# Patient Record
Sex: MALE | Race: WHITE | NOT HISPANIC OR LATINO | Employment: FULL TIME | URBAN - METROPOLITAN AREA
[De-identification: names, ages, dates, MRNs, and addresses within clinical notes are randomized per-mention and may not be internally consistent; named-entity substitution may affect disease eponyms.]

---

## 2018-05-02 ENCOUNTER — OFFICE VISIT (OUTPATIENT)
Dept: INTERNAL MEDICINE | Facility: CLINIC | Age: 28
End: 2018-05-02
Attending: FAMILY MEDICINE
Payer: MEDICAID

## 2018-05-02 VITALS
TEMPERATURE: 99 F | WEIGHT: 180 LBS | OXYGEN SATURATION: 97 % | DIASTOLIC BLOOD PRESSURE: 72 MMHG | HEART RATE: 69 BPM | BODY MASS INDEX: 28.25 KG/M2 | HEIGHT: 67 IN | SYSTOLIC BLOOD PRESSURE: 116 MMHG

## 2018-05-02 DIAGNOSIS — Z00.00 ANNUAL PHYSICAL EXAM: ICD-10-CM

## 2018-05-02 DIAGNOSIS — R42 LIGHTHEADEDNESS: Primary | ICD-10-CM

## 2018-05-02 PROCEDURE — 99395 PREV VISIT EST AGE 18-39: CPT | Mod: S$PBB,,, | Performed by: FAMILY MEDICINE

## 2018-05-02 PROCEDURE — 99999 PR PBB SHADOW E&M-EST. PATIENT-LVL IV: CPT | Mod: PBBFAC,,, | Performed by: FAMILY MEDICINE

## 2018-05-02 PROCEDURE — 99214 OFFICE O/P EST MOD 30 MIN: CPT | Mod: PBBFAC | Performed by: FAMILY MEDICINE

## 2018-05-02 NOTE — PATIENT INSTRUCTIONS
Information about cholesterol, high blood pressure and healthy diet and activity recommendations can be found at the following links on the Internet.    http://www.nhlbi.nih.gov/health/health-topics/topics/hbc    http://www.nhlbi.nih.gov/health/educational/lose_wt/index.htm    Http://www.nhlbi.nih.gov/files/docs/public/heart/hbp_low.pdf    http://www.heart.org/HEARTORG/    http://diabetes.org/    https://www.cdc.gov/    https://healthfinder.gov/        [id number] - [facility]  Clinical  or Psychologist

## 2018-05-02 NOTE — PROGRESS NOTES
Subjective:       Patient ID: Valdemar Allen is a 28 y.o. male.    Chief Complaint: Annual Exam; Headache; Fatigue; and Dizziness    Established patient for an annual wellness check/physical exam and also chronic disease management. Specific complaints - see dictation and please see ROS.        Headache    Associated symptoms include dizziness. Pertinent negatives include no abdominal pain, back pain, coughing, eye redness, fever, neck pain, numbness or weakness.   Fatigue   Associated symptoms include fatigue and headaches. Pertinent negatives include no abdominal pain, arthralgias, chest pain, chills, congestion, coughing, fever, joint swelling, myalgias, neck pain, numbness, rash or weakness.   Dizziness:    Associated symptoms: headaches and palpitations.no fever, no weakness and no chest pain.    Review of Systems   Constitutional: Positive for fatigue. Negative for chills and fever.   HENT: Negative for congestion and trouble swallowing.    Eyes: Negative for redness.   Respiratory: Negative for cough, chest tightness and shortness of breath.    Cardiovascular: Positive for palpitations. Negative for chest pain and leg swelling.   Gastrointestinal: Negative for abdominal pain and blood in stool.   Genitourinary: Negative for hematuria.   Musculoskeletal: Negative for arthralgias, back pain, gait problem, joint swelling, myalgias and neck pain.   Skin: Negative for color change and rash.   Neurological: Positive for dizziness and headaches. Negative for tremors, speech difficulty, weakness and numbness.   Hematological: Negative for adenopathy. Does not bruise/bleed easily.   Psychiatric/Behavioral: Positive for dysphoric mood and sleep disturbance. Negative for behavioral problems and confusion. The patient is nervous/anxious.        Objective:      Physical Exam   Constitutional: He is oriented to person, place, and time. He appears well-developed and well-nourished.   Eyes: No scleral icterus.   Neck: Normal  range of motion. Neck supple. No JVD present. Carotid bruit is not present. No tracheal deviation present. No thyromegaly present.   Cardiovascular: Normal rate, regular rhythm, normal heart sounds and intact distal pulses.  Exam reveals no gallop and no friction rub.    No murmur heard.  Pulmonary/Chest: Effort normal and breath sounds normal. No respiratory distress. He has no wheezes. He has no rales.   Abdominal: Soft. Bowel sounds are normal. He exhibits no distension and no mass. There is no tenderness. There is no rebound and no guarding.   Musculoskeletal: He exhibits no edema.   Lymphadenopathy:     He has no cervical adenopathy.   Neurological: He is alert and oriented to person, place, and time. He displays no tremor. No cranial nerve deficit. Coordination and gait normal.   Skin: Skin is warm and dry. No rash noted. He is not diaphoretic. No erythema.   Psychiatric: He has a normal mood and affect. His behavior is normal. Judgment and thought content normal.   Nursing note and vitals reviewed.      Assessment:       1. Lightheadedness    2. Annual physical exam        Plan:   Valdemar was seen today for annual exam, headache, fatigue and dizziness.    Diagnoses and all orders for this visit:    Lightheadedness  -     CBC Without Differential; Future  -     Basic metabolic panel; Future  -     Lipid panel; Future  -     TSH; Future    Annual physical exam  -     CBC Without Differential; Future  -     Basic metabolic panel; Future  -     Lipid panel; Future  -     TSH; Future      See meds, orders, follow up, routing and instructions sections of encounter.  A 28-year-old established male patient.  He is in for dysphoria,   lightheadedness, appetite changes, weight changes, invasive thoughts.  He has no   suicidal or homicidal ideation.  No romina hallucinations.  He did have a   relationship in November.  He was living in [place] for a while.  He is currently   living here.  He states that his diet has  changed, he has given up   carbohydrates.    His examination is normal.  Symptoms are seemingly without a biologic   explanation.  Suggested continuing exercise, recommended Mediterranean diets,   links were provided.    Follow up in 6 weeks.  Suggested self-referral for a psychologist for discussion   and suggested laboratory.      MAURA/KELLI  dd: 05/02/2018 12:28:07 (CDT)  td: 05/03/2018 09:45:36 (CDT)  Doc ID   #[id number]  Job ID #[id number]    CC:

## 2018-06-14 ENCOUNTER — OFFICE VISIT (OUTPATIENT)
Dept: INTERNAL MEDICINE | Facility: CLINIC | Age: 28
End: 2018-06-14
Attending: FAMILY MEDICINE
Payer: MEDICAID

## 2018-06-14 VITALS
HEIGHT: 67 IN | HEART RATE: 69 BPM | SYSTOLIC BLOOD PRESSURE: 110 MMHG | DIASTOLIC BLOOD PRESSURE: 60 MMHG | WEIGHT: 191 LBS | BODY MASS INDEX: 29.98 KG/M2

## 2018-06-14 DIAGNOSIS — M25.579 ANKLE PAIN, UNSPECIFIED CHRONICITY, UNSPECIFIED LATERALITY: ICD-10-CM

## 2018-06-14 DIAGNOSIS — F43.20 ADJUSTMENT DISORDER, UNSPECIFIED TYPE: ICD-10-CM

## 2018-06-14 DIAGNOSIS — R42 LIGHTHEADEDNESS: Primary | ICD-10-CM

## 2018-06-14 PROCEDURE — 99999 PR PBB SHADOW E&M-EST. PATIENT-LVL III: CPT | Mod: PBBFAC,,, | Performed by: FAMILY MEDICINE

## 2018-06-14 PROCEDURE — 99214 OFFICE O/P EST MOD 30 MIN: CPT | Mod: S$PBB,,, | Performed by: FAMILY MEDICINE

## 2018-06-14 PROCEDURE — 99213 OFFICE O/P EST LOW 20 MIN: CPT | Mod: PBBFAC | Performed by: FAMILY MEDICINE

## 2018-06-14 NOTE — PROGRESS NOTES
Subjective:       Patient ID: Valdemar Allen is a 28 y.o. male.    Chief Complaint: Follow-up    HPI  Review of Systems   Constitutional: Negative for activity change, chills, fatigue, fever and unexpected weight change.   HENT: Negative for congestion, hearing loss, rhinorrhea and trouble swallowing.    Eyes: Negative for discharge, redness and visual disturbance.   Respiratory: Negative for cough, chest tightness, shortness of breath and wheezing.    Cardiovascular: Negative for chest pain, palpitations and leg swelling.   Gastrointestinal: Negative for abdominal pain, blood in stool, constipation, diarrhea and vomiting.   Endocrine: Negative for polydipsia and polyuria.   Genitourinary: Negative for difficulty urinating, hematuria and urgency.   Musculoskeletal: Positive for arthralgias. Negative for back pain, gait problem, joint swelling, myalgias and neck pain.   Skin: Negative for color change and rash.   Neurological: Negative for tremors, speech difficulty, weakness, numbness and headaches.   Hematological: Negative for adenopathy. Does not bruise/bleed easily.   Psychiatric/Behavioral: Positive for dysphoric mood. Negative for behavioral problems, confusion and sleep disturbance. The patient is not nervous/anxious.        Objective:      Physical Exam   Constitutional: He is oriented to person, place, and time. He appears well-developed and well-nourished. No distress.   Neck: Neck supple.   Pulmonary/Chest: Effort normal.   Musculoskeletal: He exhibits no edema.        Right ankle: He exhibits normal range of motion, no deformity and normal pulse. Tenderness. Medial malleolus tenderness found. No lateral malleolus, no AITFL, no CF ligament, no posterior TFL, no head of 5th metatarsal and no proximal fibula tenderness found. Achilles tendon normal. Achilles tendon exhibits no pain, no defect and normal Myles's test results.        Right lower leg: He exhibits no edema.        Left lower leg: He exhibits  no edema.        Feet:    Neurological: He is alert and oriented to person, place, and time.   Skin: Skin is warm and dry. No rash noted.   Psychiatric: He has a normal mood and affect. His behavior is normal. Judgment and thought content normal.   Nursing note and vitals reviewed.      Assessment:       1. Lightheadedness    2. Adjustment disorder, unspecified type    3. Ankle pain, unspecified chronicity, unspecified laterality        Plan:   Valdemar was seen today for follow-up.    Diagnoses and all orders for this visit:    Lightheadedness    Adjustment disorder, unspecified type    Ankle pain, unspecified chronicity, unspecified laterality      See meds, orders, follow up, routing and instructions sections of encounter.  A 28-year-old established male patient.  He is in for a followup.  He states he   is feeling better.  He removed a coffee from his diet.  He has no other acute   complaints at this time.  He states his sleep is better.  Dizziness is gone.    He states he jumped off an object a couple of years ago, experienced pain in the   right lower extremity, improved somewhat, but now when he tries to run he has   some recurrent pain.    On examination, his stated area of tenderness is the approximate posterior   tibialis area.  He does have pes planus.  He is nontender in that area and has   no bony tenderness at the distal tibia.    RECOMMENDATIONS:  Suggested a stretching and inserts and to notify me if this   continues when he runs or if he has sustained pain in the distal tibia area.    He states he feels well enough not to need to pursue Psychology at this time.    He does have the number should things deteriorate.      MAURA/HN  dd: 06/14/2018 13:15:01 (CDT)  td: 06/15/2018 09:31:03 (CDT)  Doc ID   #[id number]  Job ID #[id number]    CC:

## 2019-01-14 ENCOUNTER — NURSE TRIAGE (OUTPATIENT)
Dept: ADMINISTRATIVE | Facility: CLINIC | Age: 29
End: 2019-01-14

## 2019-01-14 NOTE — TELEPHONE ENCOUNTER
[phone] Please call patient if he can be seen in the office.  Patient states that he does notice the chest pain in association to eating but that is radiates down his left arm.    Reason for Disposition   Chest pain lasting longer than 5 minutes    Protocols used:  CHEST PAIN-A-[place]    [id number]

## 2019-01-14 NOTE — TELEPHONE ENCOUNTER
Called to triage to ER and  No answer. LM on patients VM  Please respond directly to patient with any further advice.  Thank you ,[place]

## 2019-01-15 ENCOUNTER — OFFICE VISIT (OUTPATIENT)
Dept: INTERNAL MEDICINE | Facility: CLINIC | Age: 29
End: 2019-01-15
Attending: FAMILY MEDICINE
Payer: MEDICAID

## 2019-01-15 ENCOUNTER — LAB VISIT (OUTPATIENT)
Dept: LAB | Facility: HOSPITAL | Age: 29
End: 2019-01-15
Attending: FAMILY MEDICINE
Payer: MEDICAID

## 2019-01-15 VITALS
HEART RATE: 76 BPM | BODY MASS INDEX: 30.56 KG/M2 | DIASTOLIC BLOOD PRESSURE: 68 MMHG | WEIGHT: 194.69 LBS | OXYGEN SATURATION: 98 % | SYSTOLIC BLOOD PRESSURE: 104 MMHG | HEIGHT: 67 IN | TEMPERATURE: 98 F

## 2019-01-15 DIAGNOSIS — R10.9 ABDOMINAL PAIN, UNSPECIFIED ABDOMINAL LOCATION: ICD-10-CM

## 2019-01-15 DIAGNOSIS — R55 SYNCOPE, UNSPECIFIED SYNCOPE TYPE: ICD-10-CM

## 2019-01-15 DIAGNOSIS — R07.9 CHEST PAIN, UNSPECIFIED TYPE: Primary | ICD-10-CM

## 2019-01-15 DIAGNOSIS — R07.9 CHEST PAIN, UNSPECIFIED TYPE: ICD-10-CM

## 2019-01-15 LAB
ALBUMIN SERPL BCP-MCNC: 4.4 G/DL
ALP SERPL-CCNC: 62 U/L
ALT SERPL W/O P-5'-P-CCNC: 56 U/L
ANION GAP SERPL CALC-SCNC: 7 MMOL/L
AST SERPL-CCNC: 24 U/L
BASOPHILS # BLD AUTO: 0.03 K/UL
BASOPHILS NFR BLD: 0.5 %
BILIRUB SERPL-MCNC: 0.9 MG/DL
BILIRUB UR QL STRIP: NEGATIVE
BUN SERPL-MCNC: 13 MG/DL
CALCIUM SERPL-MCNC: 9.9 MG/DL
CHLORIDE SERPL-SCNC: 101 MMOL/L
CLARITY UR REFRACT.AUTO: CLEAR
CO2 SERPL-SCNC: 30 MMOL/L
COLOR UR AUTO: YELLOW
CREAT SERPL-MCNC: 1.1 MG/DL
DIFFERENTIAL METHOD: ABNORMAL
EOSINOPHIL # BLD AUTO: 0.2 K/UL
EOSINOPHIL NFR BLD: 3 %
ERYTHROCYTE [DISTWIDTH] IN BLOOD BY AUTOMATED COUNT: 13.4 %
EST. GFR  (AFRICAN AMERICAN): >60 ML/MIN/1.73 M^2
EST. GFR  (NON AFRICAN AMERICAN): >60 ML/MIN/1.73 M^2
GLUCOSE SERPL-MCNC: 82 MG/DL
GLUCOSE UR QL STRIP: NEGATIVE
HCT VFR BLD AUTO: 46.5 %
HGB BLD-MCNC: 14.9 G/DL
HGB UR QL STRIP: NEGATIVE
KETONES UR QL STRIP: NEGATIVE
LEUKOCYTE ESTERASE UR QL STRIP: NEGATIVE
LIPASE SERPL-CCNC: 45 U/L
LYMPHOCYTES # BLD AUTO: 2.5 K/UL
LYMPHOCYTES NFR BLD: 39.1 %
MCH RBC QN AUTO: 26.7 PG
MCHC RBC AUTO-ENTMCNC: 32 G/DL
MCV RBC AUTO: 83 FL
MICROSCOPIC COMMENT: NORMAL
MONOCYTES # BLD AUTO: 0.7 K/UL
MONOCYTES NFR BLD: 10.6 %
NEUTROPHILS # BLD AUTO: 3 K/UL
NEUTROPHILS NFR BLD: 46.6 %
NITRITE UR QL STRIP: NEGATIVE
PH UR STRIP: 6 [PH] (ref 5–8)
PLATELET # BLD AUTO: 189 K/UL
PMV BLD AUTO: 10.7 FL
POTASSIUM SERPL-SCNC: 4.1 MMOL/L
PROT SERPL-MCNC: 8 G/DL
PROT UR QL STRIP: NEGATIVE
RBC # BLD AUTO: 5.59 M/UL
SODIUM SERPL-SCNC: 138 MMOL/L
SP GR UR STRIP: 1.02 (ref 1–1.03)
URN SPEC COLLECT METH UR: NORMAL
WBC # BLD AUTO: 6.32 K/UL

## 2019-01-15 PROCEDURE — 85025 COMPLETE CBC W/AUTO DIFF WBC: CPT

## 2019-01-15 PROCEDURE — 99214 OFFICE O/P EST MOD 30 MIN: CPT | Mod: PBBFAC | Performed by: FAMILY MEDICINE

## 2019-01-15 PROCEDURE — 99214 OFFICE O/P EST MOD 30 MIN: CPT | Mod: S$PBB,,, | Performed by: FAMILY MEDICINE

## 2019-01-15 PROCEDURE — 99999 PR PBB SHADOW E&M-EST. PATIENT-LVL IV: CPT | Mod: PBBFAC,,, | Performed by: FAMILY MEDICINE

## 2019-01-15 PROCEDURE — 81001 URINALYSIS AUTO W/SCOPE: CPT

## 2019-01-15 PROCEDURE — 80053 COMPREHEN METABOLIC PANEL: CPT

## 2019-01-15 PROCEDURE — 83690 ASSAY OF LIPASE: CPT

## 2019-01-15 PROCEDURE — 99214 PR OFFICE/OUTPT VISIT, EST, LEVL IV, 30-39 MIN: ICD-10-PCS | Mod: S$PBB,,, | Performed by: FAMILY MEDICINE

## 2019-01-15 PROCEDURE — 99999 PR PBB SHADOW E&M-EST. PATIENT-LVL IV: ICD-10-PCS | Mod: PBBFAC,,, | Performed by: FAMILY MEDICINE

## 2019-01-15 PROCEDURE — 87086 URINE CULTURE/COLONY COUNT: CPT

## 2019-01-15 PROCEDURE — 36415 COLL VENOUS BLD VENIPUNCTURE: CPT

## 2019-01-15 NOTE — PROGRESS NOTES
Subjective:       Patient ID: Valdemar Allen is a 29 y.o. male.    Chief Complaint: Chest Pain; Neck Pain; and Arm Pain    HPI  Review of Systems   Constitutional: Positive for fatigue. Negative for chills, fever and unexpected weight change.   HENT: Negative for congestion and trouble swallowing.    Eyes: Negative for redness and visual disturbance.   Respiratory: Negative for cough, chest tightness and shortness of breath.    Cardiovascular: Positive for chest pain. Negative for palpitations and leg swelling.   Gastrointestinal: Positive for abdominal distention and abdominal pain. Negative for blood in stool.   Genitourinary: Negative for difficulty urinating and hematuria.   Musculoskeletal: Positive for back pain and neck pain. Negative for arthralgias, gait problem, joint swelling and myalgias.   Skin: Negative for color change and rash.   Neurological: Positive for syncope and light-headedness. Negative for tremors, speech difficulty, weakness, numbness and headaches.   Hematological: Negative for adenopathy. Does not bruise/bleed easily.   Psychiatric/Behavioral: Negative for behavioral problems, confusion and sleep disturbance. The patient is not nervous/anxious.        Objective:      Physical Exam   Constitutional: He is oriented to person, place, and time. He appears well-developed and well-nourished.   HENT:   Head: Normocephalic.   Right Ear: Tympanic membrane, external ear and ear canal normal.   Left Ear: Tympanic membrane, external ear and ear canal normal.   Mouth/Throat: Oropharynx is clear and moist.   Eyes: Pupils are equal, round, and reactive to light. No scleral icterus.   Neck: Normal range of motion. Neck supple. No JVD present. Carotid bruit is not present. No tracheal deviation present. No thyromegaly present.   Cardiovascular: Normal rate, regular rhythm, normal heart sounds and intact distal pulses. Exam reveals no gallop and no friction rub.   No murmur heard.  Pulmonary/Chest: Effort  normal and breath sounds normal. No respiratory distress. He has no wheezes. He has no rales.   Abdominal: Soft. He exhibits no distension and no mass. There is no tenderness. There is no rebound and no guarding.   Musculoskeletal: Normal range of motion. He exhibits no edema or tenderness.   Lymphadenopathy:     He has no cervical adenopathy.   Neurological: He is alert and oriented to person, place, and time. He has normal strength. He displays no tremor and normal reflexes. No cranial nerve deficit or sensory deficit. Coordination and gait normal.   Skin: Skin is warm and dry. No rash noted. He is not diaphoretic. No erythema.   Psychiatric: He has a normal mood and affect. His behavior is normal. Judgment and thought content normal.   Nursing note and vitals reviewed.      Assessment:       1. Chest pain, unspecified type    2. Syncope, unspecified syncope type    3. Abdominal pain, unspecified abdominal location        Plan:   Valdemar was seen today for chest pain, neck pain and arm pain.    Diagnoses and all orders for this visit:    Chest pain, unspecified type  -     Lipase; Future  -     CBC auto differential; Future  -     Comprehensive metabolic panel; Future  -     EKG 12-lead; Future  -     Echocardiogram stress test (Cupid Only); Future    Syncope, unspecified syncope type  -     Lipase; Future  -     CBC auto differential; Future  -     Comprehensive metabolic panel; Future  -     EKG 12-lead; Future  -     Echocardiogram stress test (Cupid Only); Future    Abdominal pain, unspecified abdominal location  -     Urinalysis  -     Urinalysis Microscopic  -     Urine culture  -     US Abdomen Complete; Future    Other orders  -     ranitidine (ZANTAC) 300 MG tablet; Take 1 tablet (300 mg total) by mouth every evening.      See meds, orders, follow up, routing and instructions sections of encounter.  This is a patient who had called in yesterday with chest pain.  He was referred   for emergent care; however,  "was scheduled with us today.  He is complaining of   chest pain, left arm and neck pain, abdominal pain.  The pattern seems to be   worse with food, possibly dairy.  His symptoms are difficult to understand as   they clinically overlap.  There is no associated dyspnea.  He does not have any   significant exertion for us to determine whether there is an exertional   component.  He does report an episode of syncope that occurred when getting up   from a couch to urinate approximately one month ago that has not recurred.  He   reports no arrhythmia.  He reports no seizure activity.  He reports no typical   angina.  He does state he has some reflux or indigestion.  He is 29.  He does   not have any significant family history of early or sudden cardiac death.  His   mother did have a cardiac problem at one point.  He is not reporting any typical   dyspnea symptoms.    Physical examination reveals tenderness to the entire abdomen, but no rebound,   guarding and normal bowel sounds.  Cardiopulmonary examination is normal.  His   peripheral vascular examination, skin turgor and capillary refill are normal   (complaint of "poor circulation").    CHART REVIEW:  Ultrasound abdomen on 11/12/2002, hepatic steatosis, no mention   of gallstone.  He had laboratory in the summer.    RECOMMENDATIONS:  His symptoms are difficult to evaluate.  I suggest a workup as   indicated.  Follow up after above workup.  Suggested a trial of Zantac and   consider GI referral for refractory symptoms.  Justification of stress   echocardiogram is a history of atypical chest pain, syncope (although likely   micturitional).      MAURA/HN  dd: 01/15/2019 10:09:46 (CST)  td: 01/16/2019 03:53:48 (CST)  Doc ID   #[id number]  Job ID #[id number]    CC:       "

## 2019-01-16 LAB — BACTERIA UR CULT: NO GROWTH

## 2019-01-17 ENCOUNTER — HOSPITAL ENCOUNTER (OUTPATIENT)
Dept: CARDIOLOGY | Facility: CLINIC | Age: 29
Discharge: HOME | End: 2019-01-17
Attending: FAMILY MEDICINE
Payer: MEDICAID

## 2019-01-17 VITALS — HEIGHT: 67 IN | WEIGHT: 190 LBS | BODY MASS INDEX: 29.82 KG/M2

## 2019-01-17 DIAGNOSIS — R55 SYNCOPE, UNSPECIFIED SYNCOPE TYPE: ICD-10-CM

## 2019-01-17 DIAGNOSIS — R07.9 CHEST PAIN, UNSPECIFIED TYPE: ICD-10-CM

## 2019-01-17 LAB
ASCENDING AORTA: 2.63 CM
BSA FOR ECHO PROCEDURE: 2.02 M2
CV ECHO LV RWT: 0.32 CM
CV STRESS BASE HR: 67
DIASTOLIC BLOOD PRESSURE: 70
DOP CALC LVOT AREA: 3.4 CM2
DOP CALC LVOT DIAMETER: 2.08 CM
DOP CALC LVOT STROKE VOLUME: 62.12 CM3
DOP CALCLVOT PEAK VEL VTI: 18.29 CM
E WAVE DECELERATION TIME: 201.7 MSEC
E/A RATIO: 1.22
E/E' RATIO: 4.87
ECHO LV POSTERIOR WALL: 0.63 CM (ref 0.6–1.1)
FRACTIONAL SHORTENING: 28 % (ref 28–44)
INTERVENTRICULAR SEPTUM: 0.64 CM (ref 0.6–1.1)
IVRT: 0.09 MSEC
LA MAJOR: 5.29 CM
LA MINOR: 5.22 CM
LA WIDTH: 3.4 CM
LEFT ATRIUM SIZE: 3.47 CM
LEFT ATRIUM VOLUME INDEX: 26.6 ML/M2
LEFT ATRIUM VOLUME: 52.7 CM3
LEFT INTERNAL DIMENSION IN SYSTOLE: 2.84 CM (ref 2.1–4)
LEFT VENTRICLE DIASTOLIC VOLUME INDEX: 34.05 ML/M2
LEFT VENTRICLE DIASTOLIC VOLUME: 67.36 ML
LEFT VENTRICLE MASS INDEX: 34.1 G/M2
LEFT VENTRICLE SYSTOLIC VOLUME INDEX: 15.5 ML/M2
LEFT VENTRICLE SYSTOLIC VOLUME: 30.58 ML
LEFT VENTRICULAR INTERNAL DIMENSION IN DIASTOLE: 3.94 CM (ref 3.5–6)
LEFT VENTRICULAR MASS: 67.37 G
LV LATERAL E/E' RATIO: 4
LV SEPTAL E/E' RATIO: 6.22
MV PEAK A VEL: 0.46 M/S
MV PEAK E VEL: 0.56 M/S
OHS CV CPX 1 MINUTE RECOVERY HEART RATE: 139 BPM
OHS CV CPX 85 PERCENT MAX PREDICTED HEART RATE MALE: 162
OHS CV CPX ESTIMATED METS: 13
OHS CV CPX MAX PREDICTED HEART RATE: 191
OHS CV CPX PATIENT IS FEMALE: 0
OHS CV CPX PATIENT IS MALE: 1
OHS CV CPX PEAK DIASTOLIC BLOOD PRESSURE: 89 MMHG
OHS CV CPX PEAK HEAR RATE: 176
OHS CV CPX PEAK RATE PRESSURE PRODUCT: NORMAL
OHS CV CPX PEAK SYSTOLIC BLOOD PRESSURE: 159
OHS CV CPX PERCENT MAX PREDICTED HEART RATE ACHIEVED: 92
OHS CV CPX PERCENT TARGET HEART RATE ACHIEVED: 108.64
OHS CV CPX RATE PRESSURE PRODUCT PRESENTING: 8241
OHS CV CPX TARGET HEART RATE: 162
PULM VEIN S/D RATIO: 1.1
PV PEAK D VEL: 0.4 M/S
PV PEAK S VEL: 0.44 M/S
RA MAJOR: 4.74 CM
RA WIDTH: 3.6 CM
RIGHT VENTRICULAR END-DIASTOLIC DIMENSION: 3.26 CM
RV TISSUE DOPPLER FREE WALL SYSTOLIC VELOCITY 1 (APICAL 4 CHAMBER VIEW): 12.42 M/S
SINUS: 3.24 CM
STJ: 2.68 CM
STRESS ECHO POST EXERCISE DUR MIN: 8 MIN
STRESS ECHO POST EXERCISE DUR SEC: 4
SYSTOLIC BLOOD PRESSURE: 123
TDI LATERAL: 0.14
TDI SEPTAL: 0.09
TDI: 0.12
TRICUSPID ANNULAR PLANE SYSTOLIC EXCURSION: 2.14 CM

## 2019-01-17 PROCEDURE — 93005 ELECTROCARDIOGRAM TRACING: CPT | Mod: PBBFAC | Performed by: INTERNAL MEDICINE

## 2019-01-17 PROCEDURE — 93351 STRESS TTE COMPLETE: CPT | Mod: PBBFAC | Performed by: INTERNAL MEDICINE

## 2019-01-17 PROCEDURE — 93351 ECHOCARDIOGRAM STRESS TEST (CUPID ONLY): ICD-10-PCS | Mod: 26,S$PBB,, | Performed by: INTERNAL MEDICINE

## 2019-01-17 PROCEDURE — 93010 EKG 12-LEAD: ICD-10-PCS | Mod: S$PBB,,, | Performed by: INTERNAL MEDICINE

## 2019-01-17 PROCEDURE — 93010 ELECTROCARDIOGRAM REPORT: CPT | Mod: S$PBB,,, | Performed by: INTERNAL MEDICINE

## 2019-01-18 ENCOUNTER — HOSPITAL ENCOUNTER (OUTPATIENT)
Dept: RADIOLOGY | Facility: HOSPITAL | Age: 29
Discharge: HOME | End: 2019-01-18
Attending: FAMILY MEDICINE
Payer: MEDICAID

## 2019-01-18 DIAGNOSIS — R10.9 ABDOMINAL PAIN, UNSPECIFIED ABDOMINAL LOCATION: ICD-10-CM

## 2019-01-18 PROCEDURE — 76700 US EXAM ABDOM COMPLETE: CPT | Mod: TC

## 2019-01-18 PROCEDURE — 76700 US EXAM ABDOM COMPLETE: CPT | Mod: 26,,, | Performed by: RADIOLOGY

## 2019-01-18 PROCEDURE — 76700 US ABDOMEN COMPLETE: ICD-10-PCS | Mod: 26,,, | Performed by: RADIOLOGY

## 2019-01-28 ENCOUNTER — TELEPHONE (OUTPATIENT)
Dept: INTERNAL MEDICINE | Facility: CLINIC | Age: 29
End: 2019-01-28

## 2019-01-28 NOTE — TELEPHONE ENCOUNTER
----- Message from Esthela Rizvi sent at 1/28/2019  3:30 PM CST -----  Contact: Self  Pt is calling to speak with Staff regarding the medication prescribed for pain.  Pt says it is helping, but is still very gasy.  He is requesting a returned call for guidance?    He can be reached at [phone].    Thank you.

## 2019-01-28 NOTE — TELEPHONE ENCOUNTER
Called patient and discussed labs and or test results. Patient expressed understanding and had the opportunity to ask questions. Any questions were answered. See meds, orders, follow up and instructions sections of encounter.    Specific issues include:  ALT sl elevated  Stress test and US OK  His pain is gone  ?? VINCENT - diet  Still belching, I do not have any further ideas, can get GI if needed

## 2019-05-08 ENCOUNTER — OFFICE VISIT (OUTPATIENT)
Dept: INTERNAL MEDICINE | Facility: CLINIC | Age: 29
End: 2019-05-08
Attending: FAMILY MEDICINE
Payer: MEDICAID

## 2019-05-08 VITALS
DIASTOLIC BLOOD PRESSURE: 68 MMHG | OXYGEN SATURATION: 96 % | BODY MASS INDEX: 30.45 KG/M2 | SYSTOLIC BLOOD PRESSURE: 116 MMHG | WEIGHT: 194 LBS | TEMPERATURE: 98 F | HEART RATE: 64 BPM | HEIGHT: 67 IN

## 2019-05-08 DIAGNOSIS — R14.2 BELCHING: ICD-10-CM

## 2019-05-08 DIAGNOSIS — M54.5 LOW BACK PAIN, UNSPECIFIED BACK PAIN LATERALITY, UNSPECIFIED CHRONICITY, WITH SCIATICA PRESENCE UNSPECIFIED: ICD-10-CM

## 2019-05-08 DIAGNOSIS — R74.01 ELEVATED TRANSAMINASE MEASUREMENT: ICD-10-CM

## 2019-05-08 DIAGNOSIS — Z00.00 ANNUAL PHYSICAL EXAM: Primary | ICD-10-CM

## 2019-05-08 PROCEDURE — 99395 PR PREVENTIVE VISIT,EST,18-39: ICD-10-PCS | Mod: S$PBB,,, | Performed by: FAMILY MEDICINE

## 2019-05-08 PROCEDURE — 99999 PR PBB SHADOW E&M-EST. PATIENT-LVL IV: CPT | Mod: PBBFAC,,, | Performed by: FAMILY MEDICINE

## 2019-05-08 PROCEDURE — 99214 OFFICE O/P EST MOD 30 MIN: CPT | Mod: PBBFAC | Performed by: FAMILY MEDICINE

## 2019-05-08 PROCEDURE — 99395 PREV VISIT EST AGE 18-39: CPT | Mod: S$PBB,,, | Performed by: FAMILY MEDICINE

## 2019-05-08 PROCEDURE — 99999 PR PBB SHADOW E&M-EST. PATIENT-LVL IV: ICD-10-PCS | Mod: PBBFAC,,, | Performed by: FAMILY MEDICINE

## 2019-05-08 NOTE — PROGRESS NOTES
Subjective:       Patient ID: Valdemar Allen is a 29 y.o. male.    Chief Complaint: Annual Exam    Established patient for an annual wellness check/physical exam and also chronic disease management. Specific complaints - see dictation and please see ROS.  P, S, Fm, Soc Hx's; Meds, allergies reviewed and reconciled.  Health maintenance file reviewed and addressed items due.    Review of Systems   Constitutional: Negative for chills, fatigue, fever and unexpected weight change.   HENT: Negative for congestion and trouble swallowing.    Eyes: Negative for redness and visual disturbance.   Respiratory: Negative for cough, chest tightness and shortness of breath.    Cardiovascular: Negative for chest pain, palpitations and leg swelling.   Gastrointestinal: Positive for abdominal distention. Negative for abdominal pain and blood in stool.   Genitourinary: Negative for difficulty urinating and hematuria.   Musculoskeletal: Negative for arthralgias, back pain, gait problem, joint swelling, myalgias and neck pain.   Skin: Negative for color change and rash.   Neurological: Negative for tremors, speech difficulty, weakness, numbness and headaches.   Hematological: Negative for adenopathy. Does not bruise/bleed easily.   Psychiatric/Behavioral: Negative for behavioral problems, confusion and sleep disturbance. The patient is not nervous/anxious.        Objective:      Physical Exam   Constitutional: He appears well-developed and well-nourished.   HENT:   Head: Normocephalic.   Right Ear: Tympanic membrane, external ear and ear canal normal.   Left Ear: Tympanic membrane, external ear and ear canal normal.   Mouth/Throat: Oropharynx is clear and moist.   Eyes: Pupils are equal, round, and reactive to light.   Neck: Normal range of motion. Neck supple. Carotid bruit is not present. No thyromegaly present.   Cardiovascular: Normal rate, regular rhythm, normal heart sounds and intact distal pulses. Exam reveals no gallop and no  friction rub.   No murmur heard.  Pulmonary/Chest: Effort normal and breath sounds normal.   Abdominal: Soft. He exhibits no distension and no mass. There is no tenderness. There is no rebound and no guarding.   Musculoskeletal: Normal range of motion. He exhibits no edema or tenderness.   Lymphadenopathy:     He has no cervical adenopathy.   Neurological: He is alert. He has normal strength. He displays normal reflexes. No cranial nerve deficit or sensory deficit. Coordination and gait normal.   Skin: Skin is warm and dry.   Psychiatric: He has a normal mood and affect. His behavior is normal. Judgment and thought content normal.   Nursing note and vitals reviewed.      Assessment:       1. Annual physical exam    2. Elevated transaminase measurement    3. Belching    4. Low back pain, unspecified back pain laterality, unspecified chronicity, with sciatica presence unspecified        Plan:   Valdemar was seen today for annual exam.    Diagnoses and all orders for this visit:    Annual physical exam    Elevated transaminase measurement  -     Ambulatory referral to Gastroenterology    Belching  -     Ambulatory referral to Gastroenterology  -     H. pylori antigen, stool; Future    Low back pain, unspecified back pain laterality, unspecified chronicity, with sciatica presence unspecified      See meds, orders, follow up, routing and instructions sections of encounter.  A 29-year-old annual physical examination by schedule, states he is still having   persistent bloating and gassiness.  He reports no definite abdominal pain.    States that this is generally all day long.    He has no other acute complaints at this time.  He has intermittent back pain   from time to time.  No new neurologic difficulties.  He has been evaluated by   Dr. Adan in the past.  He has had physical therapy in the past.  He states he is   not interested in pursuing repeat physical therapy or Back Clinic at this time   secondary to not feeling that  elisa.    RECOMMENDATIONS:  GI consult, check H. pylori stool antigen.      MAURA/KELLI  dd: 05/08/2019 14:31:55 (CDT)  td: 05/09/2019 05:22:51 (CDT)  Doc ID   #[id number]  Job ID #[id number]    CC:

## 2019-05-10 ENCOUNTER — LAB VISIT (OUTPATIENT)
Dept: LAB | Facility: HOSPITAL | Age: 29
End: 2019-05-10
Attending: FAMILY MEDICINE
Payer: MEDICAID

## 2019-05-10 DIAGNOSIS — R14.2 BELCHING: ICD-10-CM

## 2019-05-10 PROCEDURE — 87338 HPYLORI STOOL AG IA: CPT

## 2019-05-15 ENCOUNTER — TELEPHONE (OUTPATIENT)
Dept: GASTROENTEROLOGY | Facility: CLINIC | Age: 29
End: 2019-05-15

## 2019-05-15 NOTE — TELEPHONE ENCOUNTER
Ma spoke with pt , pt requested an appt at the [facility] location , message was sent to main campus triage box, pt has medicaid and pt was given the number to [facility] to schedule appt.pt as advised that we can not override [facility] schedule   Pt verbalized understanding and states he will contact [facility] for an appt

## 2019-05-15 NOTE — TELEPHONE ENCOUNTER
----- Message from Cinda Mcmanus sent at 5/15/2019  3:46 PM CDT -----  Contact: self   Needs Advice    Reason for call: pt referred by dr shin for Elevated transaminase measurement [R74.0]  Belching [R14.2] - pt also states he has acid reflux and gas - nothing available for me to book        Communication Preference:[phone]    Additional Information:

## 2019-05-16 ENCOUNTER — TELEPHONE (OUTPATIENT)
Dept: GASTROENTEROLOGY | Facility: CLINIC | Age: 29
End: 2019-05-16

## 2019-05-16 LAB — H PYLORI AG STL QL IA: NOT DETECTED

## 2019-05-16 NOTE — TELEPHONE ENCOUNTER
----- Message from Sara Johnston MA sent at 5/16/2019  2:55 PM CDT -----  Contact: self   Hi, this message was sent to Dr. Mullen. I didn't contact the patient, but I looked in his chart and see that you spoke with him yesterday.     I think this message was supposed to be sent to you.  ----- Message -----  From: Cinda Mcmanus  Sent: 5/16/2019   9:51 AM  To: Sebas Mullen    Needs Advice    Reason for call: returning your call        Communication Preference:[phone]    Additional Information:

## 2019-05-16 NOTE — TELEPHONE ENCOUNTER
Ma returned pt call because message was sent to Dr. Mullen staff and message was forward to Dr lucy KNIGHT , pt states he did not call today for an appt , he was very busy, he states he will call the [facility] for an appt when he gets time ,  The contact number listed/that was sent  was not the correct number for patient

## 2020-01-28 ENCOUNTER — HOSPITAL ENCOUNTER (EMERGENCY)
Facility: HOSPITAL | Age: 30
Discharge: HOME | End: 2020-01-28
Attending: EMERGENCY MEDICINE
Payer: MEDICAID

## 2020-01-28 VITALS
WEIGHT: 190 LBS | TEMPERATURE: 98 F | SYSTOLIC BLOOD PRESSURE: 117 MMHG | BODY MASS INDEX: 29.76 KG/M2 | RESPIRATION RATE: 16 BRPM | HEART RATE: 76 BPM | OXYGEN SATURATION: 97 % | DIASTOLIC BLOOD PRESSURE: 53 MMHG

## 2020-01-28 DIAGNOSIS — R55 NEAR SYNCOPE: ICD-10-CM

## 2020-01-28 DIAGNOSIS — R55 VASOVAGAL SYNCOPE: Primary | ICD-10-CM

## 2020-01-28 DIAGNOSIS — R51.9 HEADACHE: ICD-10-CM

## 2020-01-28 LAB
ALBUMIN SERPL BCP-MCNC: 4.2 G/DL (ref 3.5–5.2)
ALP SERPL-CCNC: 53 U/L (ref 55–135)
ALT SERPL W/O P-5'-P-CCNC: 31 U/L (ref 10–44)
ANION GAP SERPL CALC-SCNC: 8 MMOL/L (ref 8–16)
AST SERPL-CCNC: 14 U/L (ref 10–40)
BASOPHILS # BLD AUTO: 0.02 K/UL (ref 0–0.2)
BASOPHILS NFR BLD: 0.4 % (ref 0–1.9)
BILIRUB SERPL-MCNC: 0.5 MG/DL (ref 0.1–1)
BUN SERPL-MCNC: 11 MG/DL (ref 6–20)
CALCIUM SERPL-MCNC: 9.7 MG/DL (ref 8.7–10.5)
CHLORIDE SERPL-SCNC: 103 MMOL/L (ref 95–110)
CO2 SERPL-SCNC: 27 MMOL/L (ref 23–29)
CREAT SERPL-MCNC: 1.1 MG/DL (ref 0.5–1.4)
DIFFERENTIAL METHOD: NORMAL
EOSINOPHIL # BLD AUTO: 0.1 K/UL (ref 0–0.5)
EOSINOPHIL NFR BLD: 2.1 % (ref 0–8)
ERYTHROCYTE [DISTWIDTH] IN BLOOD BY AUTOMATED COUNT: 12.8 % (ref 11.5–14.5)
EST. GFR  (AFRICAN AMERICAN): >60 ML/MIN/1.73 M^2
EST. GFR  (NON AFRICAN AMERICAN): >60 ML/MIN/1.73 M^2
GLUCOSE SERPL-MCNC: 117 MG/DL (ref 70–110)
HCT VFR BLD AUTO: 45.2 % (ref 40–54)
HGB BLD-MCNC: 14.8 G/DL (ref 14–18)
IMM GRANULOCYTES # BLD AUTO: 0.01 K/UL (ref 0–0.04)
LYMPHOCYTES # BLD AUTO: 1.9 K/UL (ref 1–4.8)
LYMPHOCYTES NFR BLD: 32.9 % (ref 18–48)
MCH RBC QN AUTO: 27.2 PG (ref 27–31)
MCHC RBC AUTO-ENTMCNC: 32.7 G/DL (ref 32–36)
MCV RBC AUTO: 83 FL (ref 82–98)
MONOCYTES # BLD AUTO: 0.5 K/UL (ref 0.3–1)
MONOCYTES NFR BLD: 9.6 % (ref 4–15)
NEUTROPHILS # BLD AUTO: 3.1 K/UL (ref 1.8–7.7)
NEUTROPHILS NFR BLD: 54.8 % (ref 38–73)
NRBC BLD-RTO: 0 /100 WBC
PLATELET # BLD AUTO: 185 K/UL (ref 150–350)
PMV BLD AUTO: 10.2 FL (ref 9.2–12.9)
POTASSIUM SERPL-SCNC: 4 MMOL/L (ref 3.5–5.1)
PROT SERPL-MCNC: 7.7 G/DL (ref 6–8.4)
RBC # BLD AUTO: 5.45 M/UL (ref 4.6–6.2)
SODIUM SERPL-SCNC: 138 MMOL/L (ref 136–145)
WBC # BLD AUTO: 5.65 K/UL (ref 3.9–12.7)

## 2020-01-28 PROCEDURE — 36415 COLL VENOUS BLD VENIPUNCTURE: CPT

## 2020-01-28 PROCEDURE — 93005 ELECTROCARDIOGRAM TRACING: CPT

## 2020-01-28 PROCEDURE — 80053 COMPREHEN METABOLIC PANEL: CPT

## 2020-01-28 PROCEDURE — 85025 COMPLETE CBC W/AUTO DIFF WBC: CPT

## 2020-01-28 PROCEDURE — 99284 EMERGENCY DEPT VISIT MOD MDM: CPT | Mod: 25

## 2020-01-28 NOTE — ED PROVIDER NOTES
Encounter Date: 1/28/2020    SCRIBE #1 NOTE: I, Herb Moreland am scribing for, and in the presence of, Angel Nelson III, MD.       History     Chief Complaint   Patient presents with    near syncope     near syncope this morning, left arm tingling intermittent for a couple days       Time seen by provider: 2:00 PM on 01/28/2020    Valdemar Allen is a 30 y.o. male who presents to the ED with an onset of gradually improving light-headedness lasting approximately 6.5 hours. Pt also reports tingling without numness to the left arm which radiates to the left side of the chest for two days. His initial light-headedness was accompanied by a generalized headache which has since resolved. He endorses experiencing elevated blood pressure and heart rate recordings while at his ophthalmologist's office. The pt states that these symptoms are not familiar to a similar prior ED visit two years ago. He denies any chest pain, visual changes, weakness, or current headaches. PMHx of herniated disc without spinal surgery. No cardiopulmonary PMHx or PSHx.    The history is provided by the patient.     Review of patient's allergies indicates:  No Known Allergies  Past Medical History:   Diagnosis Date    Herniated disc     Prostatitis      Past Surgical History:   Procedure Laterality Date    TONSILLECTOMY       Family History   Problem Relation Age of Onset    Glaucoma Mother     Amblyopia Neg Hx     Blindness Neg Hx     Cataracts Neg Hx     Macular degeneration Neg Hx     Retinal detachment Neg Hx     Strabismus Neg Hx      Social History     Tobacco Use    Smoking status: Never Smoker   Substance Use Topics    Alcohol use: Yes    Drug use: No     Review of Systems   Constitutional: Negative for activity change, appetite change, chills, fatigue and fever.   Eyes: Negative for visual disturbance.   Respiratory: Negative for apnea and shortness of breath.    Cardiovascular: Negative for chest pain and palpitations.    Gastrointestinal: Negative for abdominal distention and abdominal pain.   Genitourinary: Negative for difficulty urinating.   Musculoskeletal: Negative for neck pain.   Skin: Negative for pallor and rash.   Neurological: Positive for light-headedness and headaches. Negative for weakness and numbness.        Positive tingling   Hematological: Does not bruise/bleed easily.   Psychiatric/Behavioral: Negative for agitation.       Physical Exam     Initial Vitals [01/28/20 1342]   BP Pulse Resp Temp SpO2   134/82 100 16 98.3 °F (36.8 °C) 99 %      MAP       --         Physical Exam    Nursing note and vitals reviewed.  Constitutional: He appears well-developed and well-nourished.   HENT:   Head: Normocephalic and atraumatic.   Eyes: Conjunctivae are normal.   Neck: Normal range of motion. Neck supple.   Cardiovascular: Normal rate, regular rhythm and normal heart sounds. Exam reveals no gallop and no friction rub.    No murmur heard.  Pulmonary/Chest: Breath sounds normal. No respiratory distress. He has no wheezes. He has no rhonchi. He has no rales.   Abdominal: Soft. He exhibits no distension. There is no tenderness.   Musculoskeletal: Normal range of motion.   Neurological: He is alert and oriented to person, place, and time.   Decreased light touch sensation to the V5 distribution.   Skin: Skin is warm and dry.   Psychiatric: He has a normal mood and affect.         ED Course   Procedures  Labs Reviewed   COMPREHENSIVE METABOLIC PANEL - Abnormal; Notable for the following components:       Result Value    Glucose 117 (*)     Alkaline Phosphatase 53 (*)     All other components within normal limits   CBC W/ AUTO DIFFERENTIAL     EKG Readings: (Independently Interpreted)   Normal sinus rhythm at a ventricular rate of 74 bpm with normal axis and normal intervals. No STEMI.       Imaging Results          CT Head Without Contrast (Final result)  Result time 01/28/20 14:37:40    Final result by Roxanna Sarabia MD  (01/28/20 14:37:40)                 Impression:      No acute intracranial findings.      Electronically signed by: Roxanna Sarabia MD  Date:    01/28/2020  Time:    14:37             Narrative:    EXAMINATION:  CT HEAD WITHOUT CONTRAST    CLINICAL HISTORY:  Cranial nerve 5 palsy; Headache    TECHNIQUE:  Low dose axial images were obtained through the head.  Coronal and sagittal reformations were also performed. Contrast was not administered.    COMPARISON:  None.    FINDINGS:  Ventricles, sulci, white matter are unremarkable in appearance for the patient's age.  There is no acute intracranial hemorrhage. There is no intracranial mass effect. There is no acute major vascular territory infarct. Note is made that MRI is typically more sensitive than CT particularly for detection of early or small nonhemorrhagic infarcts.  The calvarium appears intact, mastoids well pneumatized, visualized paranasal sinuses essentially clear.                                 Medical Decision Making:   History:   Old Medical Records: I decided to obtain old medical records.  Independently Interpreted Test(s):   I have ordered and independently interpreted EKG Reading(s) - see prior notes  Clinical Tests:   Lab Tests: Ordered and Reviewed  Radiological Study: Reviewed and Ordered  Medical Tests: Ordered and Reviewed  ED Management:  30-year-old male with a history of dizziness and near syncope presents after near syncope.  He appearance a headache; therefore, CT of the head is obtained and is normal. Neurological etiology is unlikely.  EKG fails to demonstrate any evidence of ischemia/MI.  He has had no arrhythmia throughout his ED stay.  The symptoms are most consistent with vasovagal syncope.       APC / Resident Notes:   I, Dr. Angel Nelson III, personally performed the services described in this documentation. All medical record entries made by the scribe were at my direction and in my presence.  I have reviewed the chart and agree  that the record reflects my personal performance and is accurate and complete       Scribe Attestation:   Scribe #1: I performed the above scribed service and the documentation accurately describes the services I performed. I attest to the accuracy of the note.                          Clinical Impression:       ICD-10-CM ICD-9-CM   1. Vasovagal syncope R55 780.2   2. Headache R51 784.0   3. Near syncope R55 780.2         Disposition:   Disposition: Discharged  Condition: Stable                     Angel Nelson III, MD  01/28/20 3099

## 2020-01-28 NOTE — ED NOTES
"Patient is resting in bed with family at the beside without any needs or questions at this time. Patient has been updated on results. Patient states that he feels better than when he arrived, "I just feel fatigued."   "

## 2020-01-28 NOTE — ED NOTES
Upon discharge, patient is AAOx4, no cardiac or respiratory complications. Follow up care and  Medications have been reviewed with patient and has been instructed to return to the ER if needed. Patient verbalized understanding and ambulated to the lobby without difficulty. Shane ALMAGUER

## 2020-01-28 NOTE — ED NOTES
Valdemar Allen presents to the ED with c/o lightheadedness that started a few days ago. Associated complaints are tingling to left arm that radiates to left chest. Patient denies any chest pain. AAOx3. Mucous membranes are pink and moist. Skin is warm, dry and intact. Lungs are clear bilaterally, respirations are regular and unlabored. Denies SOB, cough, congestion or rhinorrhea. BS active x4, no tenderness with palpation, abd is soft and not distended. Denies any appetite or activity change. S1S2, capillary refill is < 2 seconds. Denies dysuria, difficulty urinating, frequency, numbness or weakness. NAND VSS

## 2020-04-07 ENCOUNTER — OFFICE VISIT (OUTPATIENT)
Dept: INTERNAL MEDICINE | Facility: CLINIC | Age: 30
End: 2020-04-07
Attending: FAMILY MEDICINE
Payer: MEDICAID

## 2020-04-07 ENCOUNTER — TELEPHONE (OUTPATIENT)
Dept: INTERNAL MEDICINE | Facility: CLINIC | Age: 30
End: 2020-04-07

## 2020-04-07 ENCOUNTER — LAB VISIT (OUTPATIENT)
Dept: LAB | Facility: HOSPITAL | Age: 30
End: 2020-04-07
Attending: FAMILY MEDICINE
Payer: MEDICAID

## 2020-04-07 ENCOUNTER — PATIENT OUTREACH (OUTPATIENT)
Dept: ADMINISTRATIVE | Facility: OTHER | Age: 30
End: 2020-04-07

## 2020-04-07 DIAGNOSIS — R10.9 FLANK PAIN: ICD-10-CM

## 2020-04-07 DIAGNOSIS — R10.9 ABDOMINAL PAIN, UNSPECIFIED ABDOMINAL LOCATION: Primary | ICD-10-CM

## 2020-04-07 DIAGNOSIS — R10.9 ABDOMINAL PAIN, UNSPECIFIED ABDOMINAL LOCATION: ICD-10-CM

## 2020-04-07 LAB
BILIRUB UR QL STRIP: NEGATIVE
CLARITY UR REFRACT.AUTO: CLEAR
COLOR UR AUTO: ABNORMAL
GLUCOSE UR QL STRIP: NEGATIVE
HGB UR QL STRIP: NEGATIVE
KETONES UR QL STRIP: NEGATIVE
LEUKOCYTE ESTERASE UR QL STRIP: NEGATIVE
MICROSCOPIC COMMENT: NORMAL
NITRITE UR QL STRIP: NEGATIVE
PH UR STRIP: 7 [PH] (ref 5–8)
PROT UR QL STRIP: NEGATIVE
SP GR UR STRIP: 1 (ref 1–1.03)
URN SPEC COLLECT METH UR: ABNORMAL

## 2020-04-07 PROCEDURE — 81001 URINALYSIS AUTO W/SCOPE: CPT

## 2020-04-07 PROCEDURE — 99213 OFFICE O/P EST LOW 20 MIN: CPT | Mod: 95,,, | Performed by: FAMILY MEDICINE

## 2020-04-07 PROCEDURE — 99213 PR OFFICE/OUTPT VISIT, EST, LEVL III, 20-29 MIN: ICD-10-PCS | Mod: 95,,, | Performed by: FAMILY MEDICINE

## 2020-04-07 NOTE — TELEPHONE ENCOUNTER
----- Message from Octavia Quijano sent at 4/7/2020  9:43 AM CDT -----  Contact: Patient [phone]  Patient Is having pain on his right side and is warm to the touch.    Please call and advise.    Thank You

## 2020-04-07 NOTE — PROGRESS NOTES
Subjective:       Patient ID: Valdemar Allen is a 30 y.o. male.    Chief Complaint: No chief complaint on file.    The patient location is:  Home  The chief complaint leading to consultation is:  Right sided abdominal pain  Visit type: Virtual visit with synchronous audio and video  Total time spent with patient:  11 min  Each patient to whom he or she provides medical services by telemedicine is:  (1) informed of the relationship between the physician and patient and the respective role of any other health care provider with respect to management of the patient; and (2) notified that he or she may decline to receive medical services by telemedicine and may withdraw from such care at any time.    Notes:  Patient describes a 1 day onset of right flank/abdominal pain.  On the video he had demonstrated the right lateral abdominal wall, beneath the ribcage, mid axillary line.  Describes it as coming in going.  No fever, nausea, vomiting, diarrhea.  No chest pain or dyspnea.  No hematuria, dysuria.  No history of kidney stones.  He has had 2 abdominal ultrasounds over the past several years, 1 in 2019, neither showed a gallstone.  No prior pain of this nature.  Described as occurring last evening, slept for several hours and it recurred again today.    Review of Systems   Constitutional: Negative for fatigue and fever.   Respiratory: Negative for cough and shortness of breath.    Cardiovascular: Negative for chest pain and palpitations.   Gastrointestinal: Positive for abdominal pain. Negative for abdominal distention, blood in stool, constipation, diarrhea, nausea and vomiting.   Genitourinary: Negative for difficulty urinating and hematuria.       Objective:      Physical Exam    Assessment:       1. Abdominal pain, unspecified abdominal location    2. Flank pain        Plan:     Diagnoses and all orders for this visit:    Abdominal pain, unspecified abdominal location  -     Urinalysis; Future  -     Urinalysis  Microscopic; Future  -     Cancel: Urine culture; Future    Flank pain  -     Urinalysis; Future  -     Urinalysis Microscopic; Future  -     Cancel: Urine culture; Future      See meds, orders, follow up, routing and instructions sections of encounter and AVS. Discussed with patient and provided on AVS.    Pain location difficult to pinpoint diagnosis.  Could consider renal colic but not truly the flank.  Does not appear to be biliary.  Patient denies jaundice.  He was able to push on his abdomen relatively deep without much discomfort at this time.  Check urinalysis today.  Appointment for physical assessment tomorrow.  I consider this urgent care.  Should symptoms worsen today or for new, worsening or concerning symptoms, advised him to go to urgent care today.  Tylenol for pain.

## 2020-04-07 NOTE — Clinical Note
Please call patient to Schedule urinalysis orders for today. Please add patient on in the 1120 same day hold appointment tomorrow

## 2020-04-08 ENCOUNTER — OFFICE VISIT (OUTPATIENT)
Dept: GASTROENTEROLOGY | Facility: CLINIC | Age: 30
End: 2020-04-08
Payer: MEDICAID

## 2020-04-08 ENCOUNTER — OFFICE VISIT (OUTPATIENT)
Dept: INTERNAL MEDICINE | Facility: CLINIC | Age: 30
End: 2020-04-08
Attending: FAMILY MEDICINE
Payer: MEDICAID

## 2020-04-08 VITALS
BODY MASS INDEX: 29.97 KG/M2 | WEIGHT: 190.94 LBS | TEMPERATURE: 97 F | SYSTOLIC BLOOD PRESSURE: 112 MMHG | DIASTOLIC BLOOD PRESSURE: 82 MMHG | OXYGEN SATURATION: 99 % | HEIGHT: 67 IN | HEART RATE: 64 BPM

## 2020-04-08 DIAGNOSIS — R10.33 PERIUMBILICAL ABDOMINAL PAIN: Primary | ICD-10-CM

## 2020-04-08 DIAGNOSIS — K21.9 GASTROESOPHAGEAL REFLUX DISEASE, ESOPHAGITIS PRESENCE NOT SPECIFIED: ICD-10-CM

## 2020-04-08 DIAGNOSIS — R07.89 OTHER CHEST PAIN: Primary | ICD-10-CM

## 2020-04-08 DIAGNOSIS — R10.11 RUQ PAIN: ICD-10-CM

## 2020-04-08 DIAGNOSIS — R14.2 BELCHING: ICD-10-CM

## 2020-04-08 PROCEDURE — 99213 PR OFFICE/OUTPT VISIT, EST, LEVL III, 20-29 MIN: ICD-10-PCS | Mod: S$PBB,,, | Performed by: FAMILY MEDICINE

## 2020-04-08 PROCEDURE — 99213 OFFICE O/P EST LOW 20 MIN: CPT | Mod: S$PBB,,, | Performed by: FAMILY MEDICINE

## 2020-04-08 PROCEDURE — 99204 OFFICE O/P NEW MOD 45 MIN: CPT | Mod: 95,,, | Performed by: INTERNAL MEDICINE

## 2020-04-08 PROCEDURE — 99213 OFFICE O/P EST LOW 20 MIN: CPT | Mod: PBBFAC | Performed by: FAMILY MEDICINE

## 2020-04-08 PROCEDURE — 99999 PR PBB SHADOW E&M-EST. PATIENT-LVL III: ICD-10-PCS | Mod: PBBFAC,,, | Performed by: FAMILY MEDICINE

## 2020-04-08 PROCEDURE — 99204 PR OFFICE/OUTPT VISIT, NEW, LEVL IV, 45-59 MIN: ICD-10-PCS | Mod: 95,,, | Performed by: INTERNAL MEDICINE

## 2020-04-08 PROCEDURE — 99999 PR PBB SHADOW E&M-EST. PATIENT-LVL III: CPT | Mod: PBBFAC,,, | Performed by: FAMILY MEDICINE

## 2020-04-08 RX ORDER — PANTOPRAZOLE SODIUM 40 MG/1
40 TABLET, DELAYED RELEASE ORAL DAILY
Qty: 90 TABLET | Refills: 3 | Status: SHIPPED | OUTPATIENT
Start: 2020-04-08 | End: 2022-01-24

## 2020-04-08 NOTE — LETTER
April 8, 2020      Kyler Caballero MD  [address]  [facility] [address]           [facility]  [address]   [facility] [address]  Phone: [phone]  Fax: [phone]          Patient: Valdemar Allen   MR Number: [id number]   YOB: 1990   Date of Visit: 4/8/2020       Dear Dr. Kyler Cbaallero:    Thank you for referring Valdemar Allen to me for evaluation. Attached you will find relevant portions of my assessment and plan of care.    If you have questions, please do not hesitate to call me. I look forward to following Valdemar Allen along with you.    Sincerely,    Carmela Messina MD    Enclosure  CC:  No Recipients    If you would like to receive this communication electronically, please contact [email or web address] or [phone] to request more information on [employer] access.    For providers and/or their staff who would like to refer a patient to [facility], please contact us through our one-stop-shop provider referral line, [facility], at [phone].    If you feel you have received this communication in error or would no longer like to receive these types of communications, please e-mail [email or web address]

## 2020-04-08 NOTE — PATIENT INSTRUCTIONS
"1.  Start the pantoprazole every morning on an empty stomach.  2.  My office will call you to schedule the EGD once the COVID 19 restrictions have been lifted.  3.  Call [phone] to schedule the HIDA scan, which is to check your gallbladder.  Your level of pain and its progression should dictate whether you do this during the quarantine period.  4.  Try Gas-X but also do some reading about "aerophagia" and follow recommendations to decrease the amount of air that you swallow.  "

## 2020-04-08 NOTE — PROGRESS NOTES
Subjective:       Patient ID: Valdemar Allen is a 30 y.o. male.    Chief Complaint: Abdominal Pain (right side)    Please review patient's digital visit yesterday.  Complaining of right lateral abdominal wall pain.  Between pelvic brim and ribs.  No nausea vomiting diarrhea.  No fever chills back pain.  No dysuria, hematuria.  States he is better today.  Concerned yesterday was hepatic or renal.  Urinalysis reviewed and within normal limits.  No infectious disease complaints such as upper or lower respiratory.    Abdominal Pain   This is a new problem. The current episode started yesterday. The onset quality is gradual. The problem occurs constantly. The most recent episode lasted 3 hours. The problem has been gradually worsening. The pain is located in the right flank. The pain is at a severity of 4/10. The pain is moderate. The quality of the pain is aching and dull. The abdominal pain does not radiate. Associated symptoms include myalgias. Pertinent negatives include no anorexia, arthralgias, belching, constipation, diarrhea, dysuria, fever, flatus, frequency, headaches, hematochezia, hematuria, melena, nausea, vomiting or weight loss. The pain is aggravated by being still. The pain is relieved by activity. He has tried nothing for the symptoms. The treatment provided no relief. There is no history of abdominal surgery, colon cancer, Crohn's disease, gallstones, GERD, irritable bowel syndrome, pancreatitis, PUD or ulcerative colitis. Patient's medical history includes UTI. Patient's medical history does not include kidney stones.     Review of Systems   Constitutional: Negative for chills, diaphoresis, fatigue, fever and weight loss.   HENT: Negative for congestion and sore throat.    Respiratory: Negative for cough, choking, chest tightness, shortness of breath, wheezing and stridor.    Cardiovascular: Negative for chest pain, palpitations and leg swelling.   Gastrointestinal: Positive for abdominal pain.  Negative for anorexia, constipation, diarrhea, flatus, hematochezia, melena, nausea and vomiting.   Genitourinary: Negative for difficulty urinating, dysuria, frequency and hematuria.   Musculoskeletal: Positive for myalgias. Negative for arthralgias.   Neurological: Negative for weakness, light-headedness and headaches.       Objective:      Physical Exam   Constitutional: He is oriented to person, place, and time. He appears well-developed and well-nourished.   HENT:   Head: Normocephalic and atraumatic.   Eyes: Conjunctivae are normal. No scleral icterus.   Neck: Normal range of motion. Neck supple. Carotid bruit is not present.   Cardiovascular: Normal rate, regular rhythm, normal heart sounds and intact distal pulses. Exam reveals no gallop and no friction rub.   No murmur heard.  Pulmonary/Chest: Effort normal and breath sounds normal. No respiratory distress. He has no wheezes. He has no rales.   Abdominal: He exhibits no distension and no mass. There is no tenderness. There is no rebound and no guarding.   Musculoskeletal: He exhibits no edema or deformity.   Neurological: He is alert and oriented to person, place, and time. He displays no tremor. No cranial nerve deficit. Coordination and gait normal.   Skin: Skin is warm and dry. No rash noted. He is not diaphoretic. No erythema.   Psychiatric: He has a normal mood and affect. His behavior is normal. Judgment and thought content normal.   Nursing note and vitals reviewed.      Assessment:       1. Periumbilical abdominal pain        Plan:     Medication List with Changes/Refills   New Medications    PANTOPRAZOLE (PROTONIX) 40 MG TABLET    Take 1 tablet (40 mg total) by mouth once daily.     Valdemar was seen today for abdominal pain.    Diagnoses and all orders for this visit:    Periumbilical abdominal pain      See meds, orders, follow up, routing and instructions sections of encounter and AVS. Discussed with patient and provided on AVS.    His examination  today was within normal limits.  Diagnosis use was periumbilical due to limitations of available diagnoses.  Actual location of pain is lateral.  Not flank.  Not anterior abdomen.  Observe symptoms for improvement/worsening, since he is better today. Also note that he has a virtual visit with GI provider later this am.  Notify us for any change of status.

## 2020-04-08 NOTE — PROGRESS NOTES
"Subjective:       Patient ID: Valdemar Allen is a 30 y.o. male.    Chief Complaint: Gastroesophageal Reflux and GI Problem (chest pain)    The patient location is: [place]  The chief complaint leading to consultation is: chest pain, belching  Visit type: Virtual visit with synchronous audio and video  Total time spent with patient: 15 mins  Each patient to whom he or she provides medical services by telemedicine is:  (1) informed of the relationship between the physician and patient and the respective role of any other health care provider with respect to management of the patient; and (2) notified that he or she may decline to receive medical services by telemedicine and may withdraw from such care at any time.    Notes: 29 yo M complains of chest pain and belching.  He states that when he stopped exercising a year ago he noted onset of these symptoms.  He has severe belching which can occur more than 100 x per day.  He gets heartburn for all foods and liquids, and gets extreme belching with sodas.  He gets a gas pain when he eats fried foods, and that pain seems to cause a "knot" in his lower chest that then causes "tingling" which spreads over his entire left chest and left arm.  He was initially concerned by this chest pain, but now feels strongly that it is gas related and is thus not as concerned.  When things get really bad he takes famotidine, but usually only for a week.  Once he feels better he stops the famotidine, but symptoms always return.  During quarantine he has been exercising by walking more and this seems to have made things somewhat better, but for the past 2 days he has had RUQ pain that is increasing in intensity.      Review of Systems   Constitutional: Negative for chills and fever.   Eyes: Negative for photophobia and visual disturbance.   Respiratory: Negative for chest tightness, shortness of breath and wheezing.    Cardiovascular: Negative for chest pain, palpitations and leg swelling. "   Genitourinary: Negative for dysuria, flank pain and hematuria.   Musculoskeletal: Negative for joint swelling and myalgias.   Skin: Negative for color change and rash.   Neurological: Negative for dizziness and speech difficulty.   Psychiatric/Behavioral: Negative for confusion and hallucinations.       Objective:      Physical Exam   Constitutional: He is oriented to person, place, and time. He appears well-developed and well-nourished.   HENT:   Head: Normocephalic and atraumatic.   Eyes: Pupils are equal, round, and reactive to light. EOM are normal.   Neurological: He is alert and oriented to person, place, and time.   Psychiatric: He has a normal mood and affect. His behavior is normal.       Lab Results   Component Value Date    WBC 5.65 01/28/2020    HGB 14.8 01/28/2020    HCT 45.2 01/28/2020    MCV 83 01/28/2020     01/28/2020     U/s was independently visualized and reviewed by me and showed no acute changes.    Assessment:       1. Other chest pain    2. Gastroesophageal reflux disease, esophagitis presence not specified    3. RUQ pain    4. Belching        Plan:       Other chest pain; Gastroesophageal reflux disease, esophagitis presence not specified; Belching  -     pantoprazole (PROTONIX) 40 MG tablet; Take 1 tablet (40 mg total) by mouth once daily.  Dispense: 90 tablet; Refill: 3  -     Case request GI: EGD (ESOPHAGOGASTRODUODENOSCOPY)  -     Ok to try Gas-X but he needs to read a little about aerophagia and take measures to decrease amount of air ingested.    RUQ pain  -     NM Hepatobiliary Imaging with GB (HIDA); Future; Expected date: 04/08/2020    EGD will be delayed until COVID 19 restrictions are lifted, but the HIDA may need to be done sooner.  I have given the pt the scheduling number and instructed him to schedule now if this pain continues to grow in intensity.  If it begins to resolve, then this test can be delayed.

## 2020-05-04 ENCOUNTER — HOSPITAL ENCOUNTER (OUTPATIENT)
Dept: RADIOLOGY | Facility: HOSPITAL | Age: 30
Discharge: HOME | End: 2020-05-04
Attending: INTERNAL MEDICINE
Payer: MEDICAID

## 2020-05-04 DIAGNOSIS — R10.11 RUQ PAIN: ICD-10-CM

## 2020-05-04 PROCEDURE — 78227 HEPATOBIL SYST IMAGE W/DRUG: CPT | Mod: 26,,, | Performed by: RADIOLOGY

## 2020-05-04 PROCEDURE — 78227 NM HEPATOBILIARY(HIDA) WITH PHARM AND EF: ICD-10-PCS | Mod: 26,,, | Performed by: RADIOLOGY

## 2020-05-04 PROCEDURE — A9537 TC99M MEBROFENIN: HCPCS

## 2020-05-08 ENCOUNTER — TELEPHONE (OUTPATIENT)
Dept: GASTROENTEROLOGY | Facility: CLINIC | Age: 30
End: 2020-05-08

## 2020-05-08 ENCOUNTER — PATIENT MESSAGE (OUTPATIENT)
Dept: GASTROENTEROLOGY | Facility: CLINIC | Age: 30
End: 2020-05-08

## 2020-05-08 DIAGNOSIS — Z01.818 PREOP EXAMINATION: Primary | ICD-10-CM

## 2020-05-08 NOTE — TELEPHONE ENCOUNTER
EGD Prep Instructions    [facility]  [address] in [facility] Office [phone]  Endoscopy Lab [phone]    You are scheduled for an EGD with Dr. Messina on 5/21/20 at [facility].  You will enter through the [facility] Entrance and check in at Same Day Surgery.    Nothing to eat or drink after midnight before the procedure.  You MAY brush your teeth.    You MAY take your blood pressure, heart, and seizure medication on the morning of the procedure, with a SIP of water.  Hold ALL other medications until after the procedure.    If you are on blood thinners THAT YOU HAVE BEEN INSTRUCTED TO HOLD BY YOUR DOCTOR FOR THIS PROCEDURE, then do NOT take this the morning of your EGD.  Do NOT stop these medications on your own, they must be approved to be held by your doctor.  Your EGD can NOT be done if you are on these medications.  Examples of blood thinners include: Coumadin, Aggrenox, Plavix, Pradaxa, Reapro, Pletal, Xarelto, Ticagrelor, Brilinta, Eliquis, and high dose aspirin (325 mg).  You do not have to stop baby aspirin 81 mg.    You will receive a call 2-3 days before your EGD to tell you the time to arrive.  If you have not received a call by the day before your procedure, call the Endoscopy Lab at [phone].

## 2020-05-21 PROBLEM — R07.9 CHEST PAIN: Status: ACTIVE | Noted: 2020-05-21

## 2020-06-02 ENCOUNTER — TELEPHONE (OUTPATIENT)
Dept: GASTROENTEROLOGY | Facility: CLINIC | Age: 30
End: 2020-06-02

## 2021-03-08 ENCOUNTER — TELEPHONE (OUTPATIENT)
Dept: INTERNAL MEDICINE | Facility: CLINIC | Age: 31
End: 2021-03-08

## 2021-03-08 ENCOUNTER — HOSPITAL ENCOUNTER (EMERGENCY)
Facility: HOSPITAL | Age: 31
Discharge: HOME | End: 2021-03-08
Attending: EMERGENCY MEDICINE
Payer: MEDICAID

## 2021-03-08 VITALS
TEMPERATURE: 99 F | BODY MASS INDEX: 30.61 KG/M2 | DIASTOLIC BLOOD PRESSURE: 77 MMHG | HEART RATE: 79 BPM | OXYGEN SATURATION: 98 % | RESPIRATION RATE: 18 BRPM | SYSTOLIC BLOOD PRESSURE: 130 MMHG | WEIGHT: 195 LBS | HEIGHT: 67 IN

## 2021-03-08 DIAGNOSIS — R51.9 SINUS HEADACHE: Primary | ICD-10-CM

## 2021-03-08 PROCEDURE — 99282 EMERGENCY DEPT VISIT SF MDM: CPT

## 2021-03-08 PROCEDURE — 99284 EMERGENCY DEPT VISIT MOD MDM: CPT | Mod: ,,, | Performed by: EMERGENCY MEDICINE

## 2021-03-08 PROCEDURE — 99284 PR EMERGENCY DEPT VISIT,LEVEL IV: ICD-10-PCS | Mod: ,,, | Performed by: EMERGENCY MEDICINE

## 2021-03-11 ENCOUNTER — TELEPHONE (OUTPATIENT)
Dept: INTERNAL MEDICINE | Facility: CLINIC | Age: 31
End: 2021-03-11

## 2021-03-17 ENCOUNTER — OFFICE VISIT (OUTPATIENT)
Dept: INTERNAL MEDICINE | Facility: CLINIC | Age: 31
End: 2021-03-17
Payer: MEDICAID

## 2021-03-17 ENCOUNTER — TELEPHONE (OUTPATIENT)
Dept: NEUROLOGY | Facility: CLINIC | Age: 31
End: 2021-03-17

## 2021-03-17 VITALS
SYSTOLIC BLOOD PRESSURE: 118 MMHG | HEIGHT: 67 IN | BODY MASS INDEX: 30.97 KG/M2 | HEART RATE: 85 BPM | OXYGEN SATURATION: 99 % | DIASTOLIC BLOOD PRESSURE: 75 MMHG | WEIGHT: 197.31 LBS

## 2021-03-17 DIAGNOSIS — R20.0 NUMBNESS AND TINGLING IN LEFT ARM: ICD-10-CM

## 2021-03-17 DIAGNOSIS — R51.9 HEADACHE, UNSPECIFIED HEADACHE TYPE: Primary | ICD-10-CM

## 2021-03-17 DIAGNOSIS — R20.2 NUMBNESS AND TINGLING IN LEFT ARM: ICD-10-CM

## 2021-03-17 PROCEDURE — 99213 PR OFFICE/OUTPT VISIT, EST, LEVL III, 20-29 MIN: ICD-10-PCS | Mod: S$PBB,,, | Performed by: STUDENT IN AN ORGANIZED HEALTH CARE EDUCATION/TRAINING PROGRAM

## 2021-03-17 PROCEDURE — 99214 OFFICE O/P EST MOD 30 MIN: CPT | Mod: PBBFAC | Performed by: STUDENT IN AN ORGANIZED HEALTH CARE EDUCATION/TRAINING PROGRAM

## 2021-03-17 PROCEDURE — 99999 PR PBB SHADOW E&M-EST. PATIENT-LVL IV: ICD-10-PCS | Mod: PBBFAC,,, | Performed by: STUDENT IN AN ORGANIZED HEALTH CARE EDUCATION/TRAINING PROGRAM

## 2021-03-17 PROCEDURE — 99213 OFFICE O/P EST LOW 20 MIN: CPT | Mod: S$PBB,,, | Performed by: STUDENT IN AN ORGANIZED HEALTH CARE EDUCATION/TRAINING PROGRAM

## 2021-03-17 PROCEDURE — 99999 PR PBB SHADOW E&M-EST. PATIENT-LVL IV: CPT | Mod: PBBFAC,,, | Performed by: STUDENT IN AN ORGANIZED HEALTH CARE EDUCATION/TRAINING PROGRAM

## 2021-04-08 ENCOUNTER — PATIENT OUTREACH (OUTPATIENT)
Dept: ADMINISTRATIVE | Facility: OTHER | Age: 31
End: 2021-04-08

## 2021-04-12 ENCOUNTER — OFFICE VISIT (OUTPATIENT)
Dept: NEUROLOGY | Facility: CLINIC | Age: 31
End: 2021-04-12
Payer: MEDICAID

## 2021-04-12 DIAGNOSIS — G43.109 MIGRAINE WITH AURA AND WITHOUT STATUS MIGRAINOSUS, NOT INTRACTABLE: Primary | ICD-10-CM

## 2021-04-12 DIAGNOSIS — R51.9 HEADACHE, UNSPECIFIED HEADACHE TYPE: ICD-10-CM

## 2021-04-12 PROCEDURE — 99204 OFFICE O/P NEW MOD 45 MIN: CPT | Mod: 95,,, | Performed by: PHYSICIAN ASSISTANT

## 2021-04-12 PROCEDURE — 99204 PR OFFICE/OUTPT VISIT, NEW, LEVL IV, 45-59 MIN: ICD-10-PCS | Mod: 95,,, | Performed by: PHYSICIAN ASSISTANT

## 2021-04-13 ENCOUNTER — PATIENT MESSAGE (OUTPATIENT)
Dept: RESEARCH | Facility: HOSPITAL | Age: 31
End: 2021-04-13

## 2021-05-04 ENCOUNTER — LAB VISIT (OUTPATIENT)
Dept: LAB | Facility: HOSPITAL | Age: 31
End: 2021-05-04
Attending: FAMILY MEDICINE
Payer: MEDICAID

## 2021-05-04 ENCOUNTER — OFFICE VISIT (OUTPATIENT)
Dept: INTERNAL MEDICINE | Facility: CLINIC | Age: 31
End: 2021-05-04
Attending: FAMILY MEDICINE
Payer: MEDICAID

## 2021-05-04 VITALS
SYSTOLIC BLOOD PRESSURE: 100 MMHG | HEIGHT: 67 IN | BODY MASS INDEX: 30.45 KG/M2 | DIASTOLIC BLOOD PRESSURE: 62 MMHG | TEMPERATURE: 97 F | OXYGEN SATURATION: 98 % | HEART RATE: 80 BPM | WEIGHT: 194 LBS | RESPIRATION RATE: 18 BRPM

## 2021-05-04 DIAGNOSIS — M51.9 LUMBAR DISC DISEASE: ICD-10-CM

## 2021-05-04 DIAGNOSIS — Z00.00 ANNUAL PHYSICAL EXAM: ICD-10-CM

## 2021-05-04 DIAGNOSIS — Z00.00 ANNUAL PHYSICAL EXAM: Primary | ICD-10-CM

## 2021-05-04 PROBLEM — R07.9 CHEST PAIN: Status: RESOLVED | Noted: 2020-05-21 | Resolved: 2021-05-04

## 2021-05-04 PROBLEM — R07.89 OTHER CHEST PAIN: Status: RESOLVED | Noted: 2020-04-08 | Resolved: 2021-05-04

## 2021-05-04 PROBLEM — R14.2 BELCHING: Status: RESOLVED | Noted: 2020-04-08 | Resolved: 2021-05-04

## 2021-05-04 PROBLEM — R10.11 RUQ PAIN: Status: RESOLVED | Noted: 2020-04-08 | Resolved: 2021-05-04

## 2021-05-04 LAB
ALBUMIN SERPL BCP-MCNC: 4.1 G/DL (ref 3.5–5.2)
ALP SERPL-CCNC: 47 U/L (ref 55–135)
ALT SERPL W/O P-5'-P-CCNC: 39 U/L (ref 10–44)
ANION GAP SERPL CALC-SCNC: 5 MMOL/L (ref 8–16)
AST SERPL-CCNC: 21 U/L (ref 10–40)
BILIRUB SERPL-MCNC: 0.5 MG/DL (ref 0.1–1)
BUN SERPL-MCNC: 9 MG/DL (ref 6–20)
CALCIUM SERPL-MCNC: 9.6 MG/DL (ref 8.7–10.5)
CHLORIDE SERPL-SCNC: 101 MMOL/L (ref 95–110)
CHOLEST SERPL-MCNC: 200 MG/DL (ref 120–199)
CHOLEST/HDLC SERPL: 5 {RATIO} (ref 2–5)
CO2 SERPL-SCNC: 32 MMOL/L (ref 23–29)
CREAT SERPL-MCNC: 1 MG/DL (ref 0.5–1.4)
ERYTHROCYTE [DISTWIDTH] IN BLOOD BY AUTOMATED COUNT: 13.3 % (ref 11.5–14.5)
EST. GFR  (AFRICAN AMERICAN): >60 ML/MIN/1.73 M^2
EST. GFR  (NON AFRICAN AMERICAN): >60 ML/MIN/1.73 M^2
GLUCOSE SERPL-MCNC: 81 MG/DL (ref 70–110)
HCT VFR BLD AUTO: 44.6 % (ref 40–54)
HDLC SERPL-MCNC: 40 MG/DL (ref 40–75)
HDLC SERPL: 20 % (ref 20–50)
HGB BLD-MCNC: 14.4 G/DL (ref 14–18)
LDLC SERPL CALC-MCNC: 135.8 MG/DL (ref 63–159)
MCH RBC QN AUTO: 26.8 PG (ref 27–31)
MCHC RBC AUTO-ENTMCNC: 32.3 G/DL (ref 32–36)
MCV RBC AUTO: 83 FL (ref 82–98)
NONHDLC SERPL-MCNC: 160 MG/DL
PLATELET # BLD AUTO: 196 K/UL (ref 150–450)
PMV BLD AUTO: 11.3 FL (ref 9.2–12.9)
POTASSIUM SERPL-SCNC: 4 MMOL/L (ref 3.5–5.1)
PROT SERPL-MCNC: 7.6 G/DL (ref 6–8.4)
RBC # BLD AUTO: 5.37 M/UL (ref 4.6–6.2)
SODIUM SERPL-SCNC: 138 MMOL/L (ref 136–145)
TRIGL SERPL-MCNC: 121 MG/DL (ref 30–150)
TSH SERPL DL<=0.005 MIU/L-ACNC: 0.46 UIU/ML (ref 0.4–4)
WBC # BLD AUTO: 5.57 K/UL (ref 3.9–12.7)

## 2021-05-04 PROCEDURE — 36415 COLL VENOUS BLD VENIPUNCTURE: CPT | Performed by: FAMILY MEDICINE

## 2021-05-04 PROCEDURE — 99999 PR PBB SHADOW E&M-EST. PATIENT-LVL IV: ICD-10-PCS | Mod: PBBFAC,,, | Performed by: FAMILY MEDICINE

## 2021-05-04 PROCEDURE — 85027 COMPLETE CBC AUTOMATED: CPT | Performed by: FAMILY MEDICINE

## 2021-05-04 PROCEDURE — 99395 PR PREVENTIVE VISIT,EST,18-39: ICD-10-PCS | Mod: S$PBB,,, | Performed by: FAMILY MEDICINE

## 2021-05-04 PROCEDURE — 83036 HEMOGLOBIN GLYCOSYLATED A1C: CPT | Performed by: FAMILY MEDICINE

## 2021-05-04 PROCEDURE — 99395 PREV VISIT EST AGE 18-39: CPT | Mod: S$PBB,,, | Performed by: FAMILY MEDICINE

## 2021-05-04 PROCEDURE — 80061 LIPID PANEL: CPT | Performed by: FAMILY MEDICINE

## 2021-05-04 PROCEDURE — 84443 ASSAY THYROID STIM HORMONE: CPT | Performed by: FAMILY MEDICINE

## 2021-05-04 PROCEDURE — 99214 OFFICE O/P EST MOD 30 MIN: CPT | Mod: PBBFAC | Performed by: FAMILY MEDICINE

## 2021-05-04 PROCEDURE — 80053 COMPREHEN METABOLIC PANEL: CPT | Performed by: FAMILY MEDICINE

## 2021-05-04 PROCEDURE — 99999 PR PBB SHADOW E&M-EST. PATIENT-LVL IV: CPT | Mod: PBBFAC,,, | Performed by: FAMILY MEDICINE

## 2021-05-05 LAB
ESTIMATED AVG GLUCOSE: 105 MG/DL (ref 68–131)
HBA1C MFR BLD: 5.3 % (ref 4–5.6)

## 2021-07-20 ENCOUNTER — OFFICE VISIT (OUTPATIENT)
Dept: URGENT CARE | Facility: CLINIC | Age: 31
End: 2021-07-20
Payer: MEDICAID

## 2021-07-20 VITALS
BODY MASS INDEX: 31.33 KG/M2 | HEART RATE: 113 BPM | SYSTOLIC BLOOD PRESSURE: 125 MMHG | WEIGHT: 199.63 LBS | OXYGEN SATURATION: 95 % | HEIGHT: 67 IN | TEMPERATURE: 103 F | DIASTOLIC BLOOD PRESSURE: 76 MMHG

## 2021-07-20 DIAGNOSIS — U07.1 COVID-19 VIRUS DETECTED: ICD-10-CM

## 2021-07-20 DIAGNOSIS — R50.9 FEVER, UNSPECIFIED FEVER CAUSE: ICD-10-CM

## 2021-07-20 DIAGNOSIS — J02.9 PHARYNGITIS, UNSPECIFIED ETIOLOGY: ICD-10-CM

## 2021-07-20 DIAGNOSIS — R09.81 NASAL CONGESTION: ICD-10-CM

## 2021-07-20 DIAGNOSIS — U07.1 COVID-19 VIRUS INFECTION: Primary | ICD-10-CM

## 2021-07-20 DIAGNOSIS — R05.9 COUGH: ICD-10-CM

## 2021-07-20 LAB
CTP QC/QA: YES
FLUAV AG NPH QL: NEGATIVE
FLUBV AG NPH QL: NEGATIVE
S PYO RRNA THROAT QL PROBE: NEGATIVE
SARS-COV-2 RDRP RESP QL NAA+PROBE: POSITIVE

## 2021-07-20 PROCEDURE — 99204 OFFICE O/P NEW MOD 45 MIN: CPT | Mod: S$GLB,,, | Performed by: NURSE PRACTITIONER

## 2021-07-20 PROCEDURE — 99204 PR OFFICE/OUTPT VISIT, NEW, LEVL IV, 45-59 MIN: ICD-10-PCS | Mod: S$GLB,,, | Performed by: NURSE PRACTITIONER

## 2021-07-20 PROCEDURE — 87804 POCT INFLUENZA A/B: ICD-10-PCS | Mod: QW,,, | Performed by: NURSE PRACTITIONER

## 2021-07-20 PROCEDURE — 87880 STREP A ASSAY W/OPTIC: CPT | Mod: QW,,, | Performed by: NURSE PRACTITIONER

## 2021-07-20 PROCEDURE — 87635 PR SARS-COV-2 COVID-19 AMPLIFIED PROBE: ICD-10-PCS | Mod: QW,S$GLB,, | Performed by: NURSE PRACTITIONER

## 2021-07-20 PROCEDURE — 87880 POCT RAPID STREP A: ICD-10-PCS | Mod: QW,,, | Performed by: NURSE PRACTITIONER

## 2021-07-20 PROCEDURE — 87635 SARS-COV-2 COVID-19 AMP PRB: CPT | Mod: QW,S$GLB,, | Performed by: NURSE PRACTITIONER

## 2021-07-20 PROCEDURE — 87804 INFLUENZA ASSAY W/OPTIC: CPT | Mod: 59,QW,, | Performed by: NURSE PRACTITIONER

## 2021-07-20 RX ORDER — CETIRIZINE HYDROCHLORIDE 10 MG/1
10 TABLET ORAL DAILY
Qty: 30 TABLET | Refills: 0 | Status: SHIPPED | OUTPATIENT
Start: 2021-07-20 | End: 2022-01-24

## 2021-07-20 RX ORDER — FLUTICASONE PROPIONATE 50 MCG
1 SPRAY, SUSPENSION (ML) NASAL DAILY
Qty: 11.1 ML | Refills: 0 | Status: SHIPPED | OUTPATIENT
Start: 2021-07-20 | End: 2022-01-24

## 2021-07-20 RX ORDER — IBUPROFEN 600 MG/1
600 TABLET ORAL EVERY 8 HOURS PRN
Qty: 20 TABLET | Refills: 0 | Status: SHIPPED | OUTPATIENT
Start: 2021-07-20 | End: 2021-07-24

## 2021-07-20 RX ORDER — IBUPROFEN 200 MG
400 TABLET ORAL
Status: COMPLETED | OUTPATIENT
Start: 2021-07-20 | End: 2021-07-20

## 2021-07-20 RX ORDER — ALBUTEROL SULFATE 90 UG/1
2 AEROSOL, METERED RESPIRATORY (INHALATION) EVERY 6 HOURS PRN
Qty: 8 G | Refills: 0 | Status: SHIPPED | OUTPATIENT
Start: 2021-07-20 | End: 2022-01-24

## 2021-07-20 RX ADMIN — Medication 400 MG: at 08:07

## 2021-09-03 ENCOUNTER — PATIENT MESSAGE (OUTPATIENT)
Dept: NEUROLOGY | Facility: CLINIC | Age: 31
End: 2021-09-03

## 2021-09-23 DIAGNOSIS — M54.16 RADICULOPATHY, LUMBAR REGION: Primary | ICD-10-CM

## 2021-10-04 ENCOUNTER — HOSPITAL ENCOUNTER (OUTPATIENT)
Dept: RADIOLOGY | Facility: HOSPITAL | Age: 31
Discharge: HOME | End: 2021-10-04
Attending: NEUROLOGICAL SURGERY
Payer: COMMERCIAL

## 2021-10-04 DIAGNOSIS — M54.16 RADICULOPATHY, LUMBAR REGION: ICD-10-CM

## 2021-10-04 PROCEDURE — 72148 MRI LUMBAR SPINE W/O DYE: CPT | Mod: TC,PO

## 2022-01-03 ENCOUNTER — HOSPITAL ENCOUNTER (EMERGENCY)
Facility: HOSPITAL | Age: 32
Discharge: HOME | End: 2022-01-04
Attending: EMERGENCY MEDICINE
Payer: COMMERCIAL

## 2022-01-03 DIAGNOSIS — R07.9 CHEST PAIN: ICD-10-CM

## 2022-01-03 DIAGNOSIS — J01.90 ACUTE NON-RECURRENT SINUSITIS, UNSPECIFIED LOCATION: Primary | ICD-10-CM

## 2022-01-03 LAB
ALBUMIN SERPL BCP-MCNC: 4.8 G/DL (ref 3.5–5.2)
ALP SERPL-CCNC: 49 U/L (ref 55–135)
ALT SERPL W/O P-5'-P-CCNC: 52 U/L (ref 10–44)
ANION GAP SERPL CALC-SCNC: 11 MMOL/L (ref 8–16)
AST SERPL-CCNC: 28 U/L (ref 10–40)
BASOPHILS # BLD AUTO: 0.03 K/UL (ref 0–0.2)
BASOPHILS NFR BLD: 0.5 % (ref 0–1.9)
BILIRUB SERPL-MCNC: 0.9 MG/DL (ref 0.1–1)
BUN SERPL-MCNC: 9 MG/DL (ref 6–20)
CALCIUM SERPL-MCNC: 9.6 MG/DL (ref 8.7–10.5)
CHLORIDE SERPL-SCNC: 98 MMOL/L (ref 95–110)
CO2 SERPL-SCNC: 29 MMOL/L (ref 23–29)
CREAT SERPL-MCNC: 1 MG/DL (ref 0.5–1.4)
DIFFERENTIAL METHOD: ABNORMAL
EOSINOPHIL # BLD AUTO: 0.2 K/UL (ref 0–0.5)
EOSINOPHIL NFR BLD: 2.8 % (ref 0–8)
ERYTHROCYTE [DISTWIDTH] IN BLOOD BY AUTOMATED COUNT: 13.1 % (ref 11.5–14.5)
EST. GFR  (AFRICAN AMERICAN): >60 ML/MIN/1.73 M^2
EST. GFR  (NON AFRICAN AMERICAN): >60 ML/MIN/1.73 M^2
GLUCOSE SERPL-MCNC: 134 MG/DL (ref 70–110)
HCT VFR BLD AUTO: 46 % (ref 40–54)
HGB BLD-MCNC: 15.1 G/DL (ref 14–18)
IMM GRANULOCYTES # BLD AUTO: 0.01 K/UL (ref 0–0.04)
IMM GRANULOCYTES NFR BLD AUTO: 0.2 % (ref 0–0.5)
LYMPHOCYTES # BLD AUTO: 2.4 K/UL (ref 1–4.8)
LYMPHOCYTES NFR BLD: 36.5 % (ref 18–48)
MCH RBC QN AUTO: 26.9 PG (ref 27–31)
MCHC RBC AUTO-ENTMCNC: 32.8 G/DL (ref 32–36)
MCV RBC AUTO: 82 FL (ref 82–98)
MONOCYTES # BLD AUTO: 0.4 K/UL (ref 0.3–1)
MONOCYTES NFR BLD: 6.6 % (ref 4–15)
NEUTROPHILS # BLD AUTO: 3.5 K/UL (ref 1.8–7.7)
NEUTROPHILS NFR BLD: 53.4 % (ref 38–73)
NRBC BLD-RTO: 0 /100 WBC
PLATELET # BLD AUTO: 209 K/UL (ref 150–450)
PMV BLD AUTO: 10.4 FL (ref 9.2–12.9)
POTASSIUM SERPL-SCNC: 3.4 MMOL/L (ref 3.5–5.1)
PROT SERPL-MCNC: 8.2 G/DL (ref 6–8.4)
RBC # BLD AUTO: 5.62 M/UL (ref 4.6–6.2)
SODIUM SERPL-SCNC: 138 MMOL/L (ref 136–145)
TROPONIN I SERPL DL<=0.01 NG/ML-MCNC: <0.03 NG/ML
WBC # BLD AUTO: 6.5 K/UL (ref 3.9–12.7)

## 2022-01-03 PROCEDURE — 85025 COMPLETE CBC W/AUTO DIFF WBC: CPT | Performed by: NURSE PRACTITIONER

## 2022-01-03 PROCEDURE — 93010 ELECTROCARDIOGRAM REPORT: CPT | Mod: ,,, | Performed by: INTERNAL MEDICINE

## 2022-01-03 PROCEDURE — 84484 ASSAY OF TROPONIN QUANT: CPT | Performed by: NURSE PRACTITIONER

## 2022-01-03 PROCEDURE — 99284 EMERGENCY DEPT VISIT MOD MDM: CPT | Mod: 25

## 2022-01-03 PROCEDURE — 80053 COMPREHEN METABOLIC PANEL: CPT | Performed by: NURSE PRACTITIONER

## 2022-01-03 PROCEDURE — 93010 EKG 12-LEAD: ICD-10-PCS | Mod: ,,, | Performed by: INTERNAL MEDICINE

## 2022-01-03 PROCEDURE — 93005 ELECTROCARDIOGRAM TRACING: CPT | Performed by: INTERNAL MEDICINE

## 2022-01-04 VITALS
HEART RATE: 60 BPM | SYSTOLIC BLOOD PRESSURE: 107 MMHG | WEIGHT: 190 LBS | BODY MASS INDEX: 29.82 KG/M2 | TEMPERATURE: 99 F | RESPIRATION RATE: 18 BRPM | DIASTOLIC BLOOD PRESSURE: 63 MMHG | OXYGEN SATURATION: 98 % | HEIGHT: 67 IN

## 2022-01-04 LAB
SARS-COV-2 RDRP RESP QL NAA+PROBE: NEGATIVE
TROPONIN I SERPL DL<=0.01 NG/ML-MCNC: <0.03 NG/ML

## 2022-01-04 PROCEDURE — U0002 COVID-19 LAB TEST NON-CDC: HCPCS | Performed by: EMERGENCY MEDICINE

## 2022-01-04 PROCEDURE — 84484 ASSAY OF TROPONIN QUANT: CPT | Performed by: EMERGENCY MEDICINE

## 2022-01-04 RX ORDER — AMOXICILLIN AND CLAVULANATE POTASSIUM 875; 125 MG/1; MG/1
1 TABLET, FILM COATED ORAL 2 TIMES DAILY
Qty: 20 TABLET | Refills: 0 | Status: SHIPPED | OUTPATIENT
Start: 2022-01-04 | End: 2022-01-11 | Stop reason: SINTOL

## 2022-01-04 NOTE — FIRST PROVIDER EVALUATION
" Emergency Department TeleTriage Encounter Note      CHIEF COMPLAINT    Chief Complaint   Patient presents with    Chest Pain     With BP of 140/100 at home.  States he was feeling pain and numbness to left arm       VITAL SIGNS   Initial Vitals [01/03/22 1935]   BP Pulse Resp Temp SpO2   (!) 144/102 89 18 98.5 °F (36.9 °C) 99 %      MAP       --            ALLERGIES    Review of patient's allergies indicates:  No Known Allergies    PROVIDER TRIAGE NOTE  This is a teletriage evaluation of a 32 y.o. male presenting to the ED with c/o "tingling and tightness to right temple area with chest tightness x 3 days.  My head is still tight and I feel like my head is submerged under water."  Initial orders will be placed and care will be transferred to an alternate provider when patient is roomed for a full evaluation. Any additional orders and the final disposition will be determined by that provider.         ORDERS  Labs Reviewed   CBC W/ AUTO DIFFERENTIAL   COMPREHENSIVE METABOLIC PANEL   TROPONIN I       ED Orders (720h ago, onward)    Start Ordered     Status Ordering Provider    01/03/22 2027 01/03/22 2027  Saline lock IV  Once         Ordered MONICA WEISS    01/03/22 2027 01/03/22 2027  CBC auto differential  STAT         Ordered MONICA WEISS    01/03/22 2027 01/03/22 2027  Comprehensive metabolic panel  STAT         Ordered MONICA WEISS    01/03/22 2027 01/03/22 2027  EKG 12-lead  Once         Ordered MONICA WEISS    01/03/22 2027 01/03/22 2027  X-Ray Chest PA And Lateral  1 time imaging         Ordered MONICA WEISS    01/03/22 2027 01/03/22 2027  Troponin I  STAT         Ordered MONICA WEISS    01/03/22 1938 01/03/22 1938  EKG 12-lead  Once         Completed by JOSE RAFAEL GATES on 1/3/2022 at  7:48 PM MELISA OLMSTEAD            Virtual Visit Note: The provider triage portion of this emergency department evaluation and documentation was performed via [employer], a HIPAA-compliant " telemedicine application, in concert with a tele-presenter in the room. A face to face patient evaluation with one of my colleagues will occur once the patient is placed in an emergency department room.      DISCLAIMER: This note was prepared with [employer] voice recognition transcription software. Garbled syntax, mangled pronouns, and other bizarre constructions may be attributed to that software system.

## 2022-01-04 NOTE — DISCHARGE INSTRUCTIONS
Continue taking Zyrtec and using Flonase daily.  Start taking antibiotics.  Take a daily probiotic or eat yogurt with live cultures while taking antibiotics to avoid getting a yeast infection.  Return for worsening symptoms.

## 2022-01-04 NOTE — ED NOTES
Patient identifiers for Valdemar Allen checked and correct.  LOC:  Valdemar Allen is awake, alert, and aware of environment with an appropriate affect. He is oriented x 3 and speaking appropriately.  APPEARANCE:  He is resting comfortably and in no acute distress. He is clean and well groomed, patient's clothing is properly fastened.  SKIN:  The skin is warm and dry. He has normal skin turgor and moist mucus membranes. Skin is intact; no bruising or breakdown noted.  MUSCULOSKELETAL:  He is moving all extremities well, no obvious deformities noted. Pulses intact.   RESPIRATORY:  Airway is open and patent. Respirations are spontaneous and non-labored with normal effort and rate. Pt denies cough, shortness of breath  CARDIAC:  He has a normal rate and rhythm. No peripheral edema noted. Capillary refill < 3 seconds. Pt c/o discomfort that has been intermittent over the past three days.  ABDOMEN:  No distention noted.  Soft and non-tender upon palpation.  NEUROLOGICAL:  PERRL. Facial expression is symmetrical.Allergies reported:  Review of patient's allergies indicates:  No Known Allergies  OTHER NOTES:   Patient c/o sinus pressure.     Valdemar Allen is here with wife.

## 2022-01-04 NOTE — ED PROVIDER NOTES
Encounter Date: 1/3/2022       History     Chief Complaint   Patient presents with    Chest Pain     With BP of 140/100 at home.  States he was feeling pain and numbness to left arm     31 yo man with no PMH presents to the ED with chest pain. This evening he had mild chest pain and head pressure about 4:30 PM. He also felt lightheaded, felt as though his head was underwater and had some perioral paresthesias. Symptoms lasted for several hours. He has had these symptoms intermittently for the past several days. He has had some right sided ear fullness and sinus congestion for the past 10 days.  He has had minimal relief with Zyrtec and over-the-counter pain medications. He has had multiple family members with similar symptoms but all tested negative for covid 19. No cough or fevers.        Review of patient's allergies indicates:  No Known Allergies  Past Medical History:   Diagnosis Date    Chronic prostatitis 10/16/2012    Elevated transaminase measurement 9/14/2012    Herniated disc     Male pelvic pain 5/2/2013    Pelvic muscle wasting 5/9/2013    Prostatitis      Past Surgical History:   Procedure Laterality Date    ESOPHAGOGASTRODUODENOSCOPY N/A 5/21/2020    Procedure: EGD (ESOPHAGOGASTRODUODENOSCOPY);  Surgeon: Carmela Messina MD;  Location: [facility];  Service: Endoscopy;  Laterality: N/A;    TONSILLECTOMY       Family History   Problem Relation Age of Onset    Glaucoma Mother     Amblyopia Neg Hx     Blindness Neg Hx     Cataracts Neg Hx     Macular degeneration Neg Hx     Retinal detachment Neg Hx     Strabismus Neg Hx      Social History     Tobacco Use    Smoking status: Never Smoker    Smokeless tobacco: Never Used   Substance Use Topics    Alcohol use: Yes    Drug use: No     Review of Systems   Constitutional: Positive for chills and fever.   HENT: Positive for congestion, ear pain, sinus pressure and sinus pain. Negative for sore throat.    Respiratory: Negative for cough and  shortness of breath.    Cardiovascular: Positive for chest pain.   Gastrointestinal: Negative for nausea.   Genitourinary: Negative for dysuria.   Musculoskeletal: Negative for back pain.   Skin: Negative for rash.   Neurological: Negative for weakness.   Hematological: Does not bruise/bleed easily.   Psychiatric/Behavioral: Negative for confusion.   All other systems reviewed and are negative.      Physical Exam     Initial Vitals [01/03/22 1935]   BP Pulse Resp Temp SpO2   (!) 144/102 89 18 98.5 °F (36.9 °C) 99 %      MAP       --         Physical Exam    Nursing note and vitals reviewed.  Constitutional: He appears well-developed and well-nourished. No distress.   HENT:   Head: Normocephalic and atraumatic.   Bilateral TMs with small serous effusions and erythematous with dullness, posterior pharynx erythematous with no edema or exudates, sinus tenderness   Eyes: EOM are normal.   Neck: Neck supple.   Normal range of motion.  Cardiovascular: Normal rate, regular rhythm, normal heart sounds and intact distal pulses.   No murmur heard.  Pulmonary/Chest: Breath sounds normal. No respiratory distress. He has no wheezes. He has no rales.   Abdominal: Abdomen is soft. He exhibits no distension. There is no abdominal tenderness.   Musculoskeletal:         General: Normal range of motion.      Cervical back: Normal range of motion and neck supple.     Neurological: He is alert and oriented to person, place, and time. GCS score is 15. GCS eye subscore is 4. GCS verbal subscore is 5. GCS motor subscore is 6.   Skin: Skin is warm and dry. Capillary refill takes less than 2 seconds.   Psychiatric: He has a normal mood and affect.         ED Course   Procedures  Labs Reviewed   CBC W/ AUTO DIFFERENTIAL - Abnormal; Notable for the following components:       Result Value    MCH 26.9 (*)     All other components within normal limits   COMPREHENSIVE METABOLIC PANEL - Abnormal; Notable for the following components:    Potassium  3.4 (*)     Glucose 134 (*)     Alkaline Phosphatase 49 (*)     ALT 52 (*)     All other components within normal limits   TROPONIN I   TROPONIN I   SARS-COV-2 RNA AMPLIFICATION, QUAL     EKG Readings: (Independently Interpreted)   EKG is normal sinus rhythm at a rate of 74 beats per minute with no ST elevations or depressions, normal intervals, no STEMI       Imaging Results          X-Ray Chest PA And Lateral (In process)               X-Rays:   Independently Interpreted Readings:   Chest X-Ray: Normal heart size.  No infiltrates.  No acute abnormalities.     Medications - No data to display  Medical Decision Making:   ED Management:  32-year-old male presents emergency department with chest pain and sinus congestion. Ddx includes angina, ACS, PE, dissection, PNA, pneumothorax, MSK pain, rib fracture, anxiety, pericardial effusion.    In regards the patient's chest pain he has both an undetectable initial and repeat troponin.  His chest x-ray is clear his EKG is nonischemic.  His heart score 0 putting him at low risk.  Suspect musculoskeletal etiology.  Advised on NSAIDs in close follow-up with PCP.  In regards to his upper respiratory symptoms he has acute sinusitis clinically.  He is on day 10 of symptoms and thus meets criteria for antibiotics at this time.  Advised continue Zyrtec and Flonase.  Tylenol and ibuprofen for pain control. The patient is aware of and agrees with the plan of care. Detailed return precautions discussed.    Tammy Abreu MD  Emergency Medicine  01/04/2022 2:51 AM          Additional MDM:   Heart Score:    History:          Slightly suspicious.  ECG:             Normal  Age:               Less than 45 years  Risk factors: no risk factors known  Troponin:       Less than or equal to normal limit  Final Score: 0                       Clinical Impression:   Final diagnoses:  [R07.9] Chest pain  [J01.90] Acute non-recurrent sinusitis, unspecified location (Primary)          ED  Disposition Condition    Discharge Stable        ED Prescriptions     Medication Sig Dispense Start Date End Date Auth. Provider    amoxicillin-clavulanate 875-125mg (AUGMENTIN) 875-125 mg per tablet Take 1 tablet by mouth 2 (two) times daily. for 10 days 20 tablet 1/4/2022 1/14/2022 Tammy Abreu MD        Follow-up Information     Follow up With Specialties Details Why Contact Info Additional Information    Kyler Caballero MD Family Medicine, Sports Medicine Schedule an appointment as soon as possible for a visit in 2 days  [address]  [place] [address]  [phone]       [facility] - Emergency Dept Emergency Medicine  As needed, If symptoms worsen [address] [facility] [address] [phone] 1st floor           Tammy Abreu MD  01/04/22 0251

## 2022-01-07 ENCOUNTER — HOSPITAL ENCOUNTER (EMERGENCY)
Facility: HOSPITAL | Age: 32
Discharge: HOME | End: 2022-01-08
Attending: EMERGENCY MEDICINE
Payer: COMMERCIAL

## 2022-01-07 ENCOUNTER — TELEPHONE (OUTPATIENT)
Dept: INTERNAL MEDICINE | Facility: CLINIC | Age: 32
End: 2022-01-07
Payer: COMMERCIAL

## 2022-01-07 DIAGNOSIS — J06.9 UPPER RESPIRATORY TRACT INFECTION, UNSPECIFIED TYPE: Primary | ICD-10-CM

## 2022-01-07 DIAGNOSIS — R07.9 CHEST PAIN: ICD-10-CM

## 2022-01-07 PROBLEM — R07.89 ATYPICAL CHEST PAIN: Status: ACTIVE | Noted: 2022-01-07

## 2022-01-07 LAB
ALBUMIN SERPL BCP-MCNC: 5.1 G/DL (ref 3.5–5.2)
ALP SERPL-CCNC: 51 U/L (ref 55–135)
ALT SERPL W/O P-5'-P-CCNC: 53 U/L (ref 10–44)
AMPHET+METHAMPHET UR QL: NEGATIVE
ANION GAP SERPL CALC-SCNC: 12 MMOL/L (ref 8–16)
APTT PPP: 28.1 SEC (ref 23.3–35.1)
AST SERPL-CCNC: 29 U/L (ref 10–40)
BARBITURATES UR QL SCN>200 NG/ML: NEGATIVE
BASOPHILS # BLD AUTO: 0.04 K/UL (ref 0–0.2)
BASOPHILS NFR BLD: 0.6 % (ref 0–1.9)
BENZODIAZ UR QL SCN>200 NG/ML: NEGATIVE
BILIRUB SERPL-MCNC: 0.9 MG/DL (ref 0.1–1)
BILIRUB UR QL STRIP: NEGATIVE
BNP SERPL-MCNC: 17 PG/ML (ref 0–99)
BUN SERPL-MCNC: 12 MG/DL (ref 6–20)
BZE UR QL SCN: NEGATIVE
CALCIUM SERPL-MCNC: 9.6 MG/DL (ref 8.7–10.5)
CANNABINOIDS UR QL SCN: NEGATIVE
CHLORIDE SERPL-SCNC: 98 MMOL/L (ref 95–110)
CK SERPL-CCNC: 95 U/L (ref 20–200)
CLARITY UR: CLEAR
CO2 SERPL-SCNC: 26 MMOL/L (ref 23–29)
COLOR UR: YELLOW
CREAT SERPL-MCNC: 0.9 MG/DL (ref 0.5–1.4)
CREAT UR-MCNC: 49 MG/DL (ref 23–375)
DIFFERENTIAL METHOD: ABNORMAL
EOSINOPHIL # BLD AUTO: 0.2 K/UL (ref 0–0.5)
EOSINOPHIL NFR BLD: 2.9 % (ref 0–8)
ERYTHROCYTE [DISTWIDTH] IN BLOOD BY AUTOMATED COUNT: 13.1 % (ref 11.5–14.5)
EST. GFR  (AFRICAN AMERICAN): >60 ML/MIN/1.73 M^2
EST. GFR  (NON AFRICAN AMERICAN): >60 ML/MIN/1.73 M^2
GLUCOSE SERPL-MCNC: 141 MG/DL (ref 70–110)
GLUCOSE UR QL STRIP: NEGATIVE
HCT VFR BLD AUTO: 48.3 % (ref 40–54)
HGB BLD-MCNC: 15.6 G/DL (ref 14–18)
HGB UR QL STRIP: NEGATIVE
IMM GRANULOCYTES # BLD AUTO: 0.01 K/UL (ref 0–0.04)
IMM GRANULOCYTES NFR BLD AUTO: 0.1 % (ref 0–0.5)
INR PPP: 1.1
KETONES UR QL STRIP: NEGATIVE
LEUKOCYTE ESTERASE UR QL STRIP: NEGATIVE
LIPASE SERPL-CCNC: 67 U/L (ref 4–60)
LYMPHOCYTES # BLD AUTO: 2.6 K/UL (ref 1–4.8)
LYMPHOCYTES NFR BLD: 36.5 % (ref 18–48)
MAGNESIUM SERPL-MCNC: 1.8 MG/DL (ref 1.6–2.6)
MCH RBC QN AUTO: 26.4 PG (ref 27–31)
MCHC RBC AUTO-ENTMCNC: 32.3 G/DL (ref 32–36)
MCV RBC AUTO: 82 FL (ref 82–98)
MONOCYTES # BLD AUTO: 0.5 K/UL (ref 0.3–1)
MONOCYTES NFR BLD: 6.6 % (ref 4–15)
NEUTROPHILS # BLD AUTO: 3.7 K/UL (ref 1.8–7.7)
NEUTROPHILS NFR BLD: 53.3 % (ref 38–73)
NITRITE UR QL STRIP: NEGATIVE
NRBC BLD-RTO: 0 /100 WBC
OPIATES UR QL SCN: NEGATIVE
PCP UR QL SCN>25 NG/ML: NEGATIVE
PH UR STRIP: 7 [PH] (ref 5–8)
PLATELET # BLD AUTO: 213 K/UL (ref 150–450)
PMV BLD AUTO: 10.6 FL (ref 9.2–12.9)
POTASSIUM SERPL-SCNC: 3.9 MMOL/L (ref 3.5–5.1)
PROT SERPL-MCNC: 8.7 G/DL (ref 6–8.4)
PROT UR QL STRIP: NEGATIVE
PROTHROMBIN TIME: 13.7 SEC (ref 11.4–13.7)
RBC # BLD AUTO: 5.91 M/UL (ref 4.6–6.2)
SARS-COV-2 RDRP RESP QL NAA+PROBE: NEGATIVE
SODIUM SERPL-SCNC: 136 MMOL/L (ref 136–145)
SP GR UR STRIP: 1 (ref 1–1.03)
TOXICOLOGY INFORMATION: NORMAL
TROPONIN I SERPL DL<=0.01 NG/ML-MCNC: <0.03 NG/ML
TROPONIN I SERPL DL<=0.01 NG/ML-MCNC: <0.03 NG/ML
TSH SERPL DL<=0.005 MIU/L-ACNC: 0.6 UIU/ML (ref 0.34–5.6)
URN SPEC COLLECT METH UR: NORMAL
UROBILINOGEN UR STRIP-ACNC: NEGATIVE EU/DL
WBC # BLD AUTO: 7.01 K/UL (ref 3.9–12.7)

## 2022-01-07 PROCEDURE — 25000003 PHARM REV CODE 250: Performed by: EMERGENCY MEDICINE

## 2022-01-07 PROCEDURE — 81003 URINALYSIS AUTO W/O SCOPE: CPT | Mod: 59 | Performed by: EMERGENCY MEDICINE

## 2022-01-07 PROCEDURE — 83880 ASSAY OF NATRIURETIC PEPTIDE: CPT | Performed by: PHYSICIAN ASSISTANT

## 2022-01-07 PROCEDURE — 85610 PROTHROMBIN TIME: CPT | Performed by: EMERGENCY MEDICINE

## 2022-01-07 PROCEDURE — 99285 EMERGENCY DEPT VISIT HI MDM: CPT | Mod: 25

## 2022-01-07 PROCEDURE — U0002 COVID-19 LAB TEST NON-CDC: HCPCS | Performed by: EMERGENCY MEDICINE

## 2022-01-07 PROCEDURE — 25500020 PHARM REV CODE 255: Performed by: EMERGENCY MEDICINE

## 2022-01-07 PROCEDURE — 93010 ELECTROCARDIOGRAM REPORT: CPT | Mod: ,,, | Performed by: INTERNAL MEDICINE

## 2022-01-07 PROCEDURE — 80307 DRUG TEST PRSMV CHEM ANLYZR: CPT | Performed by: EMERGENCY MEDICINE

## 2022-01-07 PROCEDURE — 85730 THROMBOPLASTIN TIME PARTIAL: CPT | Performed by: EMERGENCY MEDICINE

## 2022-01-07 PROCEDURE — 93010 EKG 12-LEAD: ICD-10-PCS | Mod: ,,, | Performed by: INTERNAL MEDICINE

## 2022-01-07 PROCEDURE — 83735 ASSAY OF MAGNESIUM: CPT | Performed by: PHYSICIAN ASSISTANT

## 2022-01-07 PROCEDURE — 93005 ELECTROCARDIOGRAM TRACING: CPT | Performed by: INTERNAL MEDICINE

## 2022-01-07 PROCEDURE — 80053 COMPREHEN METABOLIC PANEL: CPT | Performed by: PHYSICIAN ASSISTANT

## 2022-01-07 PROCEDURE — 84484 ASSAY OF TROPONIN QUANT: CPT | Mod: 91 | Performed by: PHYSICIAN ASSISTANT

## 2022-01-07 PROCEDURE — 84484 ASSAY OF TROPONIN QUANT: CPT | Performed by: EMERGENCY MEDICINE

## 2022-01-07 PROCEDURE — 83690 ASSAY OF LIPASE: CPT | Performed by: PHYSICIAN ASSISTANT

## 2022-01-07 PROCEDURE — 36415 COLL VENOUS BLD VENIPUNCTURE: CPT | Performed by: PHYSICIAN ASSISTANT

## 2022-01-07 PROCEDURE — 84443 ASSAY THYROID STIM HORMONE: CPT | Performed by: PHYSICIAN ASSISTANT

## 2022-01-07 PROCEDURE — 82550 ASSAY OF CK (CPK): CPT | Performed by: PHYSICIAN ASSISTANT

## 2022-01-07 PROCEDURE — 85025 COMPLETE CBC W/AUTO DIFF WBC: CPT | Performed by: PHYSICIAN ASSISTANT

## 2022-01-07 RX ORDER — ACETAMINOPHEN 325 MG/1
325 TABLET ORAL EVERY 6 HOURS PRN
COMMUNITY

## 2022-01-07 RX ORDER — IBUPROFEN 200 MG
400 TABLET ORAL EVERY 6 HOURS PRN
COMMUNITY
End: 2022-01-24

## 2022-01-07 RX ORDER — ASPIRIN 325 MG
325 TABLET ORAL
Status: COMPLETED | OUTPATIENT
Start: 2022-01-07 | End: 2022-01-07

## 2022-01-07 RX ADMIN — ASPIRIN 325 MG ORAL TABLET 325 MG: 325 PILL ORAL at 09:01

## 2022-01-07 RX ADMIN — IOHEXOL 70 ML: 350 INJECTION, SOLUTION INTRAVENOUS at 08:01

## 2022-01-07 NOTE — TELEPHONE ENCOUNTER
----- Message from Arlin Carter sent at 1/7/2022 10:07 AM CST -----  Contact: [phone]  .1 Patient would like to get medical advice.  Symptoms (please be specific): chest pain, and head pain from the sinus infection   How long has patient had these symptoms: 4 days  Pharmacy name and phone#: [id number] - [place], [place] - [address] AT [facility]   Phone:  [phone]  Fax:  [phone]  Any drug allergies: on file  Comments: Patient would like to get medical advice. Patient was seen and was given rx, but stated that still feeling uncomfortable. Patient only want to be seen by pcp or np even by virtual but next available is until next week. Patient is asking to be seen today. Please call and advise. Thank you

## 2022-01-07 NOTE — TELEPHONE ENCOUNTER
Spoke to pt he stated he do not want to see anyone else but Dr. Caballero because he knows his history appt scheduled 1/11/2022 pt stated that is not a problem appt scheduled

## 2022-01-07 NOTE — FIRST PROVIDER EVALUATION
Emergency Department TeleTriage Encounter Note      CHIEF COMPLAINT    Chief Complaint   Patient presents with    Hypertension     X 3 DAYS    Tachycardia     X FEW DAYS       VITAL SIGNS   Initial Vitals [01/07/22 1732]   BP Pulse Resp Temp SpO2   (!) 150/119 100 20 98.3 °F (36.8 °C) 98 %      MAP       --            ALLERGIES    Review of patient's allergies indicates:  No Known Allergies    PROVIDER TRIAGE NOTE  This is a teletriage evaluation of a 32 y.o. male presenting to the ED complaining of left sided chest pain, SOB, jaw pain, left arm pain, HA, and concern about his BP x 2-3 days.        Initial orders will be placed and care will be transferred to an alternate provider when patient is roomed for a full evaluation. Any additional orders and the final disposition will be determined by that provider.           ORDERS  Labs Reviewed   CBC W/ AUTO DIFFERENTIAL   COMPREHENSIVE METABOLIC PANEL   TROPONIN I   B-TYPE NATRIURETIC PEPTIDE       ED Orders (720h ago, onward)    Start Ordered     Status Ordering Provider    01/07/22 1758 01/07/22 1758  Vital signs  Every 15 min         Ordered MAYELA MARIE P.    01/07/22 1758 01/07/22 1758  Cardiac Monitoring - Adult  Continuous        Comments: Notify Physician If:    Ordered MAYELA MARIE P.    01/07/22 1758 01/07/22 1758  Pulse Oximetry Continuous  Continuous         Ordered MAYELA MARIE P.    01/07/22 1758 01/07/22 1758  Diet NPO  Diet effective now         Ordered MAYELA MARIE P.    01/07/22 1758 01/07/22 1758  Saline lock IV  Once         Ordered MAYELA MARIE P.    01/07/22 1758 01/07/22 1758  EKG 12-lead  Once        Comments: Do not perform if previously done during this visit/ triage    Ordered MAYELA MARIE P.    01/07/22 1758 01/07/22 1758  CBC auto differential  STAT         Ordered MAYELA MARIE P.    01/07/22 1758 01/07/22 1758  Comprehensive metabolic panel  STAT         Ordered MAYELA MARIE P.    01/07/22 1758 01/07/22  1758  Troponin I #1  STAT         Ordered MAYELA MARIE P.    01/07/22 1758 01/07/22 1758  B-Type natriuretic peptide (BNP)  STAT         Ordered MAYELA MARIE P.    01/07/22 1758 01/07/22 1758  X-Ray Chest AP Portable  1 time imaging         Ordered MAYELA MARIE P.    01/07/22 1735 01/07/22 1734  EKG 12-lead  Once         Ordered AB PACHECO            Virtual Visit Note: The provider triage portion of this emergency department evaluation and documentation was performed via [email or web address], a HIPAA-compliant telemedicine application, in concert with a tele-presenter in the room. A face to face patient evaluation with one of my colleagues will occur once the patient is placed in an emergency department room.      DISCLAIMER: This note was prepared with [employer] voice recognition transcription software. Garbled syntax, mangled pronouns, and other bizarre constructions may be attributed to that software system.

## 2022-01-08 VITALS
HEIGHT: 67 IN | TEMPERATURE: 98 F | HEART RATE: 88 BPM | WEIGHT: 195 LBS | DIASTOLIC BLOOD PRESSURE: 79 MMHG | OXYGEN SATURATION: 99 % | BODY MASS INDEX: 30.61 KG/M2 | RESPIRATION RATE: 18 BRPM | SYSTOLIC BLOOD PRESSURE: 126 MMHG

## 2022-01-08 NOTE — ED NOTES
Adult Physical Assessment  LOC: Valdemar Allen, 32 y.o. male verified via two identifiers.  The patient is awake, alert, oriented and speaking appropriately at this time.  APPEARANCE: Patient resting comfortably and appears to be in no acute distress at this time. Patient is clean and well groomed, patient's clothing is properly fastened.  SKIN:The skin is warm and dry, color consistent with ethnicity, patient has normal skin turgor and moist mucus membranes, skin intact, no breakdown or brusing noted.  MUSCULOSKELETAL: Patient moving all extremities well, no obvious swelling or deformities noted.  RESPIRATORY: Airway is open and patent, respirations are spontaneous, patient has a normal effort and rate, no accessory muscle use noted.  CARDIAC: Patient has a normal rate and rhythm, no periphreal edema noted in any extremity, capillary refill < 3 seconds in all extremities. States his Blood pressure has been elevated off and on since he developed a sinus infection. states he has felt a little discomfort in the middle of  His chest as well.  ABDOMEN: Soft and non tender to palpation, no abdominal distention noted. Bowel sounds present in all four quadrants.  NEUROLOGIC: Eyes open spontaneously, behavior appropriate to situation, follows commands, facial expression symmetrical, bilateral hand grasp equal and even, purposeful motor response noted, normal sensation in all extremities when touched with a finger.

## 2022-01-08 NOTE — HPI
Patient is a 32-year-old male with no known significant past medical history of presented to the ED with chief complaint of left-sided chest discomfort.    Hospital Medicine has been consulted for evaluation of his chest pain.  He reports being in his usual state of health until new year's Aleksandra when he started experiencing symptoms consistent with sinusitis.  At that time he also had some left-sided chest discomfort.  It is intermittent in nature.  Reports chest pain as burning in nature and located over the precordial region with radiation to the left axilla.  Also has associated heaviness in the head.  He was prescribed amoxicillin clavulanate for sinusitis.  He felt better for a couple of days only for symptoms to get worse today.  Chest pain is worse with exertion however he feels exhausted after ambulating for about 15 minutes.  He also feels weakness in both his legs.  No shortness of breath, diaphoresis or lightheadedness.  In addition to antibiotics he has been trying cetirizine, Tylenol p.m. and famotidine with minimal relief.  His blood pressure was borderline elevated.     No known history essential hypertension.  Family history is notable for essential hypertension and hyperlipidemia in his mother.  No known history of premature CAD.  Patient is not a smoker.      Rest of the 10 point review of systems is negative except as mentioned above.

## 2022-01-08 NOTE — CONSULTS
[facility] - Emergency Dept  Hospital Medicine  Consult Note    Patient Name: Valdemar Allen  MRN: [id number]  Admission Date: 1/7/2022  Hospital Length of Stay: 0 days  Attending Physician: Monica Saini MD   Primary Care Provider: Kyler Rodriguez MD           Patient information was obtained from patient, past medical records and ER records.     Inpatient consult to Hospitalist  Consult performed by: Rakan Coffman MD  Consult ordered by: Monica Saini MD        Subjective:     Principal Problem: Atypical chest pain    Chief Complaint:   Chief Complaint   Patient presents with    Hypertension     X 3 DAYS    Tachycardia     X FEW DAYS        HPI: Patient is a 32-year-old male with no known significant past medical history of presented to the ED with chief complaint of left-sided chest discomfort.    Hospital Medicine has been consulted for evaluation of his chest pain.  He reports being in his usual state of health until new year's Aleksandra when he started experiencing symptoms consistent with sinusitis.  At that time he also had some left-sided chest discomfort.  It is intermittent in nature.  Reports chest pain as burning in nature and located over the precordial region with radiation to the left axilla.  Also has associated heaviness in the head.  He was prescribed amoxicillin clavulanate for sinusitis.  He felt better for a couple of days only for symptoms to get worse today.  Chest pain is worse with exertion however he feels exhausted after ambulating for about 15 minutes.  He also feels weakness in both his legs.  No shortness of breath, diaphoresis or lightheadedness.  In addition to antibiotics he has been trying cetirizine, Tylenol p.m. and famotidine with minimal relief.  His blood pressure was borderline elevated.     No known history essential hypertension.  Family history is notable for essential hypertension and hyperlipidemia in his mother.  No known history of premature CAD.   Patient is not a smoker.      Rest of the 10 point review of systems is negative except as mentioned above.      Past Medical History:   Diagnosis Date    Chronic prostatitis 10/16/2012    Elevated transaminase measurement 9/14/2012    Herniated disc     Male pelvic pain 5/2/2013    Pelvic muscle wasting 5/9/2013    Prostatitis        Past Surgical History:   Procedure Laterality Date    ESOPHAGOGASTRODUODENOSCOPY N/A 5/21/2020    Procedure: EGD (ESOPHAGOGASTRODUODENOSCOPY);  Surgeon: Carmela Messina MD;  Location[facility];  Service: Endoscopy;  Laterality: N/A;    TONSILLECTOMY         Review of patient's allergies indicates:  No Known Allergies    No current facility-administered medications on file prior to encounter.     Current Outpatient Medications on File Prior to Encounter   Medication Sig    acetaminophen (TYLENOL) 325 MG tablet Take 325 mg by mouth every 6 (six) hours as needed for Pain.    amoxicillin-clavulanate 875-125mg (AUGMENTIN) 875-125 mg per tablet Take 1 tablet by mouth 2 (two) times daily. for 10 days    cetirizine (ZYRTEC) 10 MG tablet Take 1 tablet (10 mg total) by mouth once daily.    ibuprofen (ADVIL,MOTRIN) 200 MG tablet Take 400 mg by mouth every 6 (six) hours as needed for Pain.    albuterol (VENTOLIN HFA) 90 mcg/actuation inhaler Inhale 2 puffs into the lungs every 6 (six) hours as needed for Wheezing or Shortness of Breath. Rescue    fluticasone propionate (FLONASE) 50 mcg/actuation nasal spray 1 spray (50 mcg total) by Each Nostril route once daily.    pantoprazole (PROTONIX) 40 MG tablet Take 1 tablet (40 mg total) by mouth once daily.     Family History     Problem Relation (Age of Onset)    Glaucoma Mother        Tobacco Use    Smoking status: Never Smoker    Smokeless tobacco: Never Used   Substance and Sexual Activity    Alcohol use: Yes    Drug use: No    Sexual activity: Not Currently       Objective:     Vital Signs (Most Recent):  Temp: 98.3 °F (36.8  °C) (01/07/22 1732)  Pulse: 74 (01/07/22 1900)  Resp: 15 (01/07/22 1900)  BP: 116/74 (01/07/22 1900)  SpO2: 98 % (01/07/22 1732) Vital Signs (24h Range):  Temp:  [98.3 °F (36.8 °C)] 98.3 °F (36.8 °C)  Pulse:  [] 74  Resp:  [15-20] 15  SpO2:  [98 %] 98 %  BP: (116-150)/() 116/74     Weight: 88.5 kg (195 lb)  Body mass index is 30.54 kg/m².    Physical Exam    General: Patient resting comfortably in no acute distress.  Eyes:  No conjunctival pallor.  No scleral icterus.  Lungs: CTA. Good air entry.  Cor: Regular rate and rhythm. No murmurs. No pedal edema.  Abd: Soft. Nontender.  Musculoskeletal: No arthropathy, deformity.  Skin: No rashes, swelling, or erythema.  Neuro: A&O x3. Moving all extremities equally  Ext: No clubbing. No cyanosis. Peripheral pulses +    Significant Labs:   All pertinent labs within the past 24 hours have been reviewed.  CBC:   Recent Labs   Lab 01/07/22 1757   WBC 7.01   HGB 15.6   HCT 48.3        CMP:   Recent Labs   Lab 01/07/22 1757      K 3.9   CL 98   CO2 26   *   BUN 12   CREATININE 0.9   CALCIUM 9.6   PROT 8.7*   ALBUMIN 5.1   BILITOT 0.9   ALKPHOS 51*   AST 29   ALT 53*   ANIONGAP 12   EGFRNONAA >60.0     Cardiac Markers:   Recent Labs   Lab 01/07/22 1757   BNP 17     Troponin:   Recent Labs   Lab 01/07/22 1757   TROPONINI <0.030       Significant Imaging: I have reviewed all pertinent imaging results/findings within the past 24 hours.     Imaging Results          CTA Chest Non-Coronary (PE Study) (Final result)  Result time 01/07/22 21:05:56    Final result by Kyler Kitchen MD (01/07/22 21:05:56)                 Narrative:    CT ANGIOGRAM CHEST WITH CONTRAST    HISTORY: Dyspnea, chest pain Pulmonary embolism.    COMPARISON: None.    TECHNIQUE: Radiation dose reduction techniques were utilized, including automated exposure control and exposure modulation based on body size. Axial contrast-enhanced images of the chest were obtained utilizing  angiographic technique. 3-D MIP reformatted images were supplemented and reviewed.    FINDINGS CHEST CT: Lungs are clear. No edema, effusion, or pulmonary embolism. Aorta without aneurysm or dissection. No adenopathy. Normal heart size.    IMPRESSION:  1. No active disease or pulmonary embolism    Electronically signed by:  Kyler Kitchen MD  1/7/2022 9:05 PM CST Workstation: [id number]                             X-Ray Chest AP Portable (Final result)  Result time 01/07/22 18:45:06    Final result by Chris Rodriguez MD (01/07/22 18:45:06)                 Narrative:    Reason: Chest Pain    FINDINGS:    PA and lateral chest with comparison chest x-ray January 3, 2022 show normal cardiomediastinal silhouette.  Lungs are clear. Pulmonary vasculature is normal. No acute osseous abnormality.    IMPRESSION:    No acute cardiopulmonary abnormality.    Electronically signed by:  Chris Rodriguez DO  1/7/2022 6:45 PM CST Workstation: [id number]                            EKG:  Independently interpreted.  Normal sinus rhythm.  No evidence of ST elevation or depression.      Assessment/Plan:     Active Hospital Problems    Diagnosis  POA    *Atypical chest pain [R07.89]  Yes      Resolved Hospital Problems   No resolved problems to display.       Plan:  Chest pain appears to be atypical in nature  His heart risk score is 2 putting him at low score for major adverse cardiac events  Suspect gastrointestinal in origin given burning in nature and relief with antacids  Can try GI cocktail  Repeat another troponin level and if normal patient can safely be discharged with close outpatient monitoring  He has a follow-up appointment with his PCP on Tuesday.  Encouraged to discuss events of this hospitalization  Also discussed return precautions      Thank you for your consult. I will sign off. Please contact us if you have any additional questions.    Rakan Coffman MD  Department of Hospital Medicine   [facility] -  Emergency Dept

## 2022-01-08 NOTE — ED PROVIDER NOTES
Encounter Date: 1/7/2022       History     Chief Complaint   Patient presents with    Hypertension     X 3 DAYS    Tachycardia     X FEW DAYS     32-year-old male presents with complaints of chest pain.  Patient recently seen in the emergency room for similar symptoms.  Had cardiac enzymes and EKG during his original presentation also had upper respiratory symptoms and was diagnosed with a sinus infection.  Has been taking Augmentin as directed over the past 4 days.  Presents secondary to worsening episodes of chest pain and shortness of breath.  Pain is constant in nature.  The pain does get worse when he has anxiety.  The pain does not get worse with any sort of exertion.  He has never had any exertional symptoms.  Shortness of breath also occurs when he is anxious.  He denies lightheadedness syncope or near-syncope.  He has had sinus congestion.  Blood pressure was noted to be elevated at home.  He has been taking over-the-counter cold medications.  He denies any other problems or complaints.        Review of patient's allergies indicates:   Allergen Reactions    Augmentin [amoxicillin-pot clavulanate] Palpitations and Other (See Comments)     Patient states that he feels like is having a heart attack      Past Medical History:   Diagnosis Date    Chronic prostatitis 10/16/2012    Elevated transaminase measurement 9/14/2012    Herniated disc     Male pelvic pain 5/2/2013    Pelvic muscle wasting 5/9/2013    Prostatitis      Past Surgical History:   Procedure Laterality Date    ESOPHAGOGASTRODUODENOSCOPY N/A 5/21/2020    Procedure: EGD (ESOPHAGOGASTRODUODENOSCOPY);  Surgeon: Carmela Messina MD;  Location: [facility];  Service: Endoscopy;  Laterality: N/A;    TONSILLECTOMY       Family History   Problem Relation Age of Onset    Hypertension Father     Glaucoma Mother     Hypertension Maternal Aunt     Hypertension Maternal Uncle     Heart disease Maternal Uncle     Amblyopia Neg Hx      Blindness Neg Hx     Cataracts Neg Hx     Macular degeneration Neg Hx     Retinal detachment Neg Hx     Strabismus Neg Hx     Heart attack Neg Hx     Heart failure Neg Hx      Social History     Tobacco Use    Smoking status: Never Smoker    Smokeless tobacco: Never Used   Substance Use Topics    Alcohol use: Not Currently    Drug use: No     Review of Systems   Constitutional: Positive for activity change, appetite change, chills and fatigue. Negative for diaphoresis, fever and unexpected weight change.   HENT: Positive for congestion and rhinorrhea. Negative for dental problem, ear pain, nosebleeds, postnasal drip, sinus pressure, sinus pain, sore throat, tinnitus, trouble swallowing and voice change.    Eyes: Negative.  Negative for pain and visual disturbance.   Respiratory: Positive for cough and shortness of breath. Negative for chest tightness and wheezing.    Cardiovascular: Positive for chest pain and palpitations. Negative for leg swelling.   Gastrointestinal: Negative.  Negative for abdominal distention, abdominal pain, anal bleeding, blood in stool, constipation, diarrhea, nausea, rectal pain and vomiting.   Endocrine: Negative.    Genitourinary: Negative.  Negative for difficulty urinating, dysuria, flank pain, frequency, penile pain, scrotal swelling, testicular pain and urgency.   Musculoskeletal: Positive for arthralgias and myalgias. Negative for back pain, gait problem, joint swelling, neck pain and neck stiffness.   Skin: Negative.  Negative for color change, pallor and rash.   Neurological: Positive for light-headedness. Negative for dizziness, seizures, syncope, facial asymmetry, speech difficulty, weakness, numbness and headaches.   Hematological: Negative.  Does not bruise/bleed easily.   Psychiatric/Behavioral: Negative.  Negative for confusion.   All other systems reviewed and are negative.      Physical Exam     Initial Vitals [01/07/22 1732]   BP Pulse Resp Temp SpO2   (!)  150/119 100 20 98.3 °F (36.8 °C) 98 %      MAP       --         Physical Exam    Nursing note and vitals reviewed.  Constitutional: He appears well-developed and well-nourished. He is not diaphoretic. He is active.  Non-toxic appearance. He does not have a sickly appearance. He does not appear ill. No distress.   HENT:   Head: Normocephalic and atraumatic.   Right Ear: A middle ear effusion is present.   Left Ear: A middle ear effusion is present.   Nose: Nose normal.   Mouth/Throat: Uvula is midline, oropharynx is clear and moist and mucous membranes are normal. No trismus in the jaw. No uvula swelling. No oropharyngeal exudate, posterior oropharyngeal edema, posterior oropharyngeal erythema or tonsillar abscesses.   Eyes: Conjunctivae, EOM and lids are normal. Pupils are equal, round, and reactive to light. Right eye exhibits no discharge. Left eye exhibits no discharge. No scleral icterus.   Neck: Trachea normal and phonation normal. Neck supple. No thyromegaly present. No stridor present. No tracheal deviation present. No JVD present.   Normal range of motion.   Full passive range of motion without pain.     Cardiovascular: Normal rate, regular rhythm, normal heart sounds, intact distal pulses and normal pulses. Exam reveals no gallop, no distant heart sounds, no friction rub and no decreased pulses.    No murmur heard.  Pulmonary/Chest: Effort normal and breath sounds normal. No stridor. No respiratory distress. He has no decreased breath sounds. He has no wheezes. He has no rhonchi. He has no rales.   Abdominal: Abdomen is soft. Normal appearance and bowel sounds are normal. He exhibits no distension and no mass. There is no abdominal tenderness. No hernia. There is no rebound and no guarding.   Musculoskeletal:         General: No tenderness or edema. Normal range of motion.      Cervical back: Normal, full passive range of motion without pain, normal range of motion and neck supple. No swelling, edema,  erythema, rigidity, tenderness or bony tenderness. No spinous process tenderness or muscular tenderness. Normal range of motion and normal range of motion. Normal.      Thoracic back: Normal. No swelling, tenderness or bony tenderness. Normal range of motion.      Lumbar back: Normal. No swelling, edema, tenderness or bony tenderness. Normal range of motion.      Comments: Pulses are 2+ throughout, cap refill is less than 2 sec throughout, no edema noted, extremities are nontender throughout with full range of motion     Neurological: He is alert and oriented to person, place, and time. He has normal strength. No cranial nerve deficit or sensory deficit. Coordination and gait normal.   Skin: Skin is warm and dry. Capillary refill takes less than 2 seconds. No ecchymosis, no petechiae and no rash noted. No cyanosis or erythema. No pallor.   Psychiatric: He has a normal mood and affect. His speech is normal and behavior is normal. Judgment and thought content normal. Cognition and memory are normal.         ED Course   Procedures  Labs Reviewed   CBC W/ AUTO DIFFERENTIAL - Abnormal; Notable for the following components:       Result Value    MCH 26.4 (*)     All other components within normal limits   COMPREHENSIVE METABOLIC PANEL - Abnormal; Notable for the following components:    Glucose 141 (*)     Total Protein 8.7 (*)     Alkaline Phosphatase 51 (*)     ALT 53 (*)     All other components within normal limits   LIPASE - Abnormal; Notable for the following components:    Lipase 67 (*)     All other components within normal limits   TROPONIN I   B-TYPE NATRIURETIC PEPTIDE   APTT   B-TYPE NATRIURETIC PEPTIDE   DRUG SCREEN PANEL, URINE EMERGENCY    Narrative:     Specimen Source->Urine   LIPASE   PROTIME-INR   TSH   URINALYSIS    Narrative:     Specimen Source->Urine   MAGNESIUM   CK   SARS-COV-2 RNA AMPLIFICATION, QUAL   TSH   CK   MAGNESIUM   TROPONIN I        ECG Results          EKG 12-lead (Final result)   Result time 01/09/22 20:35:35    Final result by Interface, Lab [facility] (01/09/22 20:35:35)                 Narrative:    Test Reason : I10,    Vent. Rate : 095 BPM     Atrial Rate : 095 BPM     P-R Int : 152 ms          QRS Dur : 068 ms      QT Int : 338 ms       P-R-T Axes : 074 038 033 degrees     QTc Int : 424 ms    Normal sinus rhythm  Possible Left atrial enlargement  Borderline Abnormal ECG  When compared with ECG of 03-JAN-2022 19:42,  No significant change was found  Confirmed by Jey Coates MD ([id number]) on 1/9/2022 8:35:22 PM    Referred By: [email or web address]   SELF           Confirmed By:Jey Coates MD                            Imaging Results          CTA Chest Non-Coronary (PE Study) (Final result)  Result time 01/07/22 21:05:56    Final result by Kyler Kitchen MD (01/07/22 21:05:56)                 Narrative:    CT ANGIOGRAM CHEST WITH CONTRAST    HISTORY: Dyspnea, chest pain Pulmonary embolism.    COMPARISON: None.    TECHNIQUE: Radiation dose reduction techniques were utilized, including automated exposure control and exposure modulation based on body size. Axial contrast-enhanced images of the chest were obtained utilizing angiographic technique. 3-D MIP reformatted images were supplemented and reviewed.    FINDINGS CHEST CT: Lungs are clear. No edema, effusion, or pulmonary embolism. Aorta without aneurysm or dissection. No adenopathy. Normal heart size.    IMPRESSION:  1. No active disease or pulmonary embolism    Electronically signed by:  Kyler Kitchen MD  1/7/2022 9:05 PM CST Workstation: [id number]                             X-Ray Chest AP Portable (Final result)  Result time 01/07/22 18:45:06    Final result by Chris Rodriguez MD (01/07/22 18:45:06)                 Narrative:    Reason: Chest Pain    FINDINGS:    PA and lateral chest with comparison chest x-ray January 3, 2022 show normal cardiomediastinal silhouette.  Lungs are clear. Pulmonary vasculature is normal. No acute osseous  abnormality.    IMPRESSION:    No acute cardiopulmonary abnormality.    Electronically signed by:  Chris Rodriguez   1/7/2022 6:45 PM CST Workstation: [id number]                               Medications   iohexoL (OMNIPAQUE 350) injection 70 mL (70 mLs Intravenous Given 1/7/22 2039)   aspirin tablet 325 mg (325 mg Oral Given 1/7/22 2105)     Medical Decision Making:   Clinical Tests:   Lab Tests: Reviewed  Radiological Study: Reviewed  Medical Tests: Reviewed  ED Management:  CTA negative for pulmonary embolism.  Heart score 0. Pains are not ever exertional in nature.  Pain has been constant.  Troponin is normal.  Patient admits that the pain seems to be worse with anxiety.  He is hemodynamically stable.  EKG negative for evidence of acute ischemia.  Patient has an upcoming appointment with cardiology and feels well.  Feels comfortable with prefers discharge home.  Return precautions discussed in detail and importance of close outpatient evaluation discussed.    Additional MDM:   Heart Score:    History:          Slightly suspicious.  ECG:             Normal  Age:               Less than 45 years  Risk factors: no risk factors known  Troponin:       Less than or equal to normal limit  Final Score: 0                       Clinical Impression:   Final diagnoses:  [R07.9] Chest pain  [J06.9] Upper respiratory tract infection, unspecified type (Primary)          ED Disposition Condition    Discharge Stable        ED Prescriptions     None        Follow-up Information     Follow up With Specialties Details Why Contact Info    Kyler Caballero MD Family Medicine, Sports Medicine Schedule an appointment as soon as possible for a visit in 2 days  [address]  [place] [address]  [phone]      Vern Coates MD Interventional Cardiology, Cardiology, Cardiovascular Disease Schedule an appointment as soon as possible for a visit in 3 days  [address]  [address]  [place] [address]  [phone]              Monica Saini MD  01/24/22 1600

## 2022-01-08 NOTE — DISCHARGE INSTRUCTIONS
Please read and follow discharge instructions and return precautions.  Rest, avoid any strenuous activity, over exertion or overheating.  Keep well hydrated.  Return immediately if you develop new or worsening symptoms or if you have new problems or concerns.  Important to see your physician in the next 3 days.  Important to follow-up with Dr. Coates or a cardiologist of your choice.  These discharge instructions are being given on behalf of --the hospitalist physician who evaluated you in the emergency room as he is the discharging physician

## 2022-01-08 NOTE — SUBJECTIVE & OBJECTIVE
Past Medical History:   Diagnosis Date    Chronic prostatitis 10/16/2012    Elevated transaminase measurement 9/14/2012    Herniated disc     Male pelvic pain 5/2/2013    Pelvic muscle wasting 5/9/2013    Prostatitis        Past Surgical History:   Procedure Laterality Date    ESOPHAGOGASTRODUODENOSCOPY N/A 5/21/2020    Procedure: EGD (ESOPHAGOGASTRODUODENOSCOPY);  Surgeon: Carmela Messina MD;  Location: [facility];  Service: Endoscopy;  Laterality: N/A;    TONSILLECTOMY         Review of patient's allergies indicates:  No Known Allergies    No current facility-administered medications on file prior to encounter.     Current Outpatient Medications on File Prior to Encounter   Medication Sig    acetaminophen (TYLENOL) 325 MG tablet Take 325 mg by mouth every 6 (six) hours as needed for Pain.    amoxicillin-clavulanate 875-125mg (AUGMENTIN) 875-125 mg per tablet Take 1 tablet by mouth 2 (two) times daily. for 10 days    cetirizine (ZYRTEC) 10 MG tablet Take 1 tablet (10 mg total) by mouth once daily.    ibuprofen (ADVIL,MOTRIN) 200 MG tablet Take 400 mg by mouth every 6 (six) hours as needed for Pain.    albuterol (VENTOLIN HFA) 90 mcg/actuation inhaler Inhale 2 puffs into the lungs every 6 (six) hours as needed for Wheezing or Shortness of Breath. Rescue    fluticasone propionate (FLONASE) 50 mcg/actuation nasal spray 1 spray (50 mcg total) by Each Nostril route once daily.    pantoprazole (PROTONIX) 40 MG tablet Take 1 tablet (40 mg total) by mouth once daily.     Family History     Problem Relation (Age of Onset)    Glaucoma Mother        Tobacco Use    Smoking status: Never Smoker    Smokeless tobacco: Never Used   Substance and Sexual Activity    Alcohol use: Yes    Drug use: No    Sexual activity: Not Currently       Objective:     Vital Signs (Most Recent):  Temp: 98.3 °F (36.8 °C) (01/07/22 1732)  Pulse: 74 (01/07/22 1900)  Resp: 15 (01/07/22 1900)  BP: 116/74 (01/07/22 1900)  SpO2: 98 %  (01/07/22 1732) Vital Signs (24h Range):  Temp:  [98.3 °F (36.8 °C)] 98.3 °F (36.8 °C)  Pulse:  [] 74  Resp:  [15-20] 15  SpO2:  [98 %] 98 %  BP: (116-150)/() 116/74     Weight: 88.5 kg (195 lb)  Body mass index is 30.54 kg/m².    Physical Exam    General: Patient resting comfortably in no acute distress.  Eyes:  No conjunctival pallor.  No scleral icterus.  Lungs: CTA. Good air entry.  Cor: Regular rate and rhythm. No murmurs. No pedal edema.  Abd: Soft. Nontender.  Musculoskeletal: No arthropathy, deformity.  Skin: No rashes, swelling, or erythema.  Neuro: A&O x3. Moving all extremities equally  Ext: No clubbing. No cyanosis. Peripheral pulses +    Significant Labs:   All pertinent labs within the past 24 hours have been reviewed.  CBC:   Recent Labs   Lab 01/07/22  1757   WBC 7.01   HGB 15.6   HCT 48.3        CMP:   Recent Labs   Lab 01/07/22  1757      K 3.9   CL 98   CO2 26   *   BUN 12   CREATININE 0.9   CALCIUM 9.6   PROT 8.7*   ALBUMIN 5.1   BILITOT 0.9   ALKPHOS 51*   AST 29   ALT 53*   ANIONGAP 12   EGFRNONAA >60.0     Cardiac Markers:   Recent Labs   Lab 01/07/22 1757   BNP 17     Troponin:   Recent Labs   Lab 01/07/22 1757   TROPONINI <0.030       Significant Imaging: I have reviewed all pertinent imaging results/findings within the past 24 hours.     Imaging Results          CTA Chest Non-Coronary (PE Study) (Final result)  Result time 01/07/22 21:05:56    Final result by Kyler Kitchen MD (01/07/22 21:05:56)                 Narrative:    CT ANGIOGRAM CHEST WITH CONTRAST    HISTORY: Dyspnea, chest pain Pulmonary embolism.    COMPARISON: None.    TECHNIQUE: Radiation dose reduction techniques were utilized, including automated exposure control and exposure modulation based on body size. Axial contrast-enhanced images of the chest were obtained utilizing angiographic technique. 3-D MIP reformatted images were supplemented and reviewed.    FINDINGS CHEST CT: Lungs are  clear. No edema, effusion, or pulmonary embolism. Aorta without aneurysm or dissection. No adenopathy. Normal heart size.    IMPRESSION:  1. No active disease or pulmonary embolism    Electronically signed by:  Kyler Kitchen MD  1/7/2022 9:05 PM CST Workstation: [id number]                             X-Ray Chest AP Portable (Final result)  Result time 01/07/22 18:45:06    Final result by Chris Rodriguez MD (01/07/22 18:45:06)                 Narrative:    Reason: Chest Pain    FINDINGS:    PA and lateral chest with comparison chest x-ray January 3, 2022 show normal cardiomediastinal silhouette.  Lungs are clear. Pulmonary vasculature is normal. No acute osseous abnormality.    IMPRESSION:    No acute cardiopulmonary abnormality.    Electronically signed by:  Chris Rodriguez DO  1/7/2022 6:45 PM CST Workstation: [id number]                            EKG:  Independently interpreted.  Normal sinus rhythm.  No evidence of ST elevation or depression.

## 2022-01-11 ENCOUNTER — OFFICE VISIT (OUTPATIENT)
Dept: INTERNAL MEDICINE | Facility: CLINIC | Age: 32
End: 2022-01-11
Attending: FAMILY MEDICINE
Payer: COMMERCIAL

## 2022-01-11 ENCOUNTER — LAB VISIT (OUTPATIENT)
Dept: LAB | Facility: HOSPITAL | Age: 32
End: 2022-01-11
Attending: FAMILY MEDICINE
Payer: COMMERCIAL

## 2022-01-11 VITALS
WEIGHT: 194.25 LBS | HEART RATE: 78 BPM | HEIGHT: 67 IN | DIASTOLIC BLOOD PRESSURE: 72 MMHG | SYSTOLIC BLOOD PRESSURE: 118 MMHG | OXYGEN SATURATION: 98 % | BODY MASS INDEX: 30.49 KG/M2

## 2022-01-11 DIAGNOSIS — R51.9 NONINTRACTABLE HEADACHE, UNSPECIFIED CHRONICITY PATTERN, UNSPECIFIED HEADACHE TYPE: ICD-10-CM

## 2022-01-11 DIAGNOSIS — J32.9 BACTERIAL SINUSITIS: Primary | ICD-10-CM

## 2022-01-11 DIAGNOSIS — R74.8 ELEVATED LIPASE: ICD-10-CM

## 2022-01-11 DIAGNOSIS — R07.89 ATYPICAL CHEST PAIN: ICD-10-CM

## 2022-01-11 DIAGNOSIS — B96.89 BACTERIAL SINUSITIS: Primary | ICD-10-CM

## 2022-01-11 DIAGNOSIS — Z86.16 HISTORY OF 2019 NOVEL CORONAVIRUS DISEASE (COVID-19): ICD-10-CM

## 2022-01-11 DIAGNOSIS — R43.0 ANOSMIA: ICD-10-CM

## 2022-01-11 DIAGNOSIS — J01.90 ACUTE SINUSITIS, RECURRENCE NOT SPECIFIED, UNSPECIFIED LOCATION: ICD-10-CM

## 2022-01-11 DIAGNOSIS — K21.9 GASTROESOPHAGEAL REFLUX DISEASE, UNSPECIFIED WHETHER ESOPHAGITIS PRESENT: ICD-10-CM

## 2022-01-11 LAB — LIPASE SERPL-CCNC: 68 U/L (ref 4–60)

## 2022-01-11 PROCEDURE — 3074F SYST BP LT 130 MM HG: CPT | Mod: CPTII,S$GLB,, | Performed by: FAMILY MEDICINE

## 2022-01-11 PROCEDURE — 3074F PR MOST RECENT SYSTOLIC BLOOD PRESSURE < 130 MM HG: ICD-10-PCS | Mod: CPTII,S$GLB,, | Performed by: FAMILY MEDICINE

## 2022-01-11 PROCEDURE — 3078F PR MOST RECENT DIASTOLIC BLOOD PRESSURE < 80 MM HG: ICD-10-PCS | Mod: CPTII,S$GLB,, | Performed by: FAMILY MEDICINE

## 2022-01-11 PROCEDURE — 99214 PR OFFICE/OUTPT VISIT, EST, LEVL IV, 30-39 MIN: ICD-10-PCS | Mod: S$GLB,,, | Performed by: FAMILY MEDICINE

## 2022-01-11 PROCEDURE — 3078F DIAST BP <80 MM HG: CPT | Mod: CPTII,S$GLB,, | Performed by: FAMILY MEDICINE

## 2022-01-11 PROCEDURE — 99999 PR PBB SHADOW E&M-EST. PATIENT-LVL V: CPT | Mod: PBBFAC,,, | Performed by: FAMILY MEDICINE

## 2022-01-11 PROCEDURE — 1160F PR REVIEW ALL MEDS BY PRESCRIBER/CLIN PHARMACIST DOCUMENTED: ICD-10-PCS | Mod: CPTII,S$GLB,, | Performed by: FAMILY MEDICINE

## 2022-01-11 PROCEDURE — 1159F PR MEDICATION LIST DOCUMENTED IN MEDICAL RECORD: ICD-10-PCS | Mod: CPTII,S$GLB,, | Performed by: FAMILY MEDICINE

## 2022-01-11 PROCEDURE — 99999 PR PBB SHADOW E&M-EST. PATIENT-LVL V: ICD-10-PCS | Mod: PBBFAC,,, | Performed by: FAMILY MEDICINE

## 2022-01-11 PROCEDURE — 83690 ASSAY OF LIPASE: CPT | Performed by: FAMILY MEDICINE

## 2022-01-11 PROCEDURE — 99214 OFFICE O/P EST MOD 30 MIN: CPT | Mod: S$GLB,,, | Performed by: FAMILY MEDICINE

## 2022-01-11 PROCEDURE — 3008F PR BODY MASS INDEX (BMI) DOCUMENTED: ICD-10-PCS | Mod: CPTII,S$GLB,, | Performed by: FAMILY MEDICINE

## 2022-01-11 PROCEDURE — 1160F RVW MEDS BY RX/DR IN RCRD: CPT | Mod: CPTII,S$GLB,, | Performed by: FAMILY MEDICINE

## 2022-01-11 PROCEDURE — 1159F MED LIST DOCD IN RCRD: CPT | Mod: CPTII,S$GLB,, | Performed by: FAMILY MEDICINE

## 2022-01-11 PROCEDURE — 36415 COLL VENOUS BLD VENIPUNCTURE: CPT | Performed by: FAMILY MEDICINE

## 2022-01-11 PROCEDURE — 3008F BODY MASS INDEX DOCD: CPT | Mod: CPTII,S$GLB,, | Performed by: FAMILY MEDICINE

## 2022-01-11 RX ORDER — DOXYCYCLINE HYCLATE 100 MG
100 TABLET ORAL 2 TIMES DAILY
Qty: 14 TABLET | Refills: 0 | Status: SHIPPED | OUTPATIENT
Start: 2022-01-11 | End: 2022-01-18

## 2022-01-11 NOTE — PATIENT INSTRUCTIONS
Patient Education       Doxycycline (reina al FITCH kimberlybraxton)   Brand Names: US Acticlate; Avidoxy; Doryx; Doryx MPC; Doxy 100; Lymepak; Mondoxyne NL; Monodox [DSC]; Morgidox [DSC]; Okebo [DSC]; Oracea; Soloxide [DSC]; TargaDOX; Vibramycin   Brand Names: [place] APO-Doxy; APO-Doxycycline MR; Apprilon; Doxycin; Doxytab; Periostat; PMS-Doxycycline [DSC]; TEVA-Doxycycline   What is this drug used for?   · It is used to treat pimples (acne).  · It is used to treat or prevent bacterial infections.  · It is used to prevent malaria.  · It is used to treat swelling of the tissue around the teeth (periodontitis). It is used with scaling and root planing.  · It is used to treat rosacea.  · It may be given to you for other reasons. Talk with the doctor.    What do I need to tell my doctor BEFORE I take this drug?   · If you are allergic to this drug; any part of this drug; or any other drugs, foods, or substances. Tell your doctor about the allergy and what signs you had.  · If you are taking any of these drugs: Acitretin, isotretinoin, or a penicillin.  This is not a list of all drugs or health problems that interact with this drug.  Tell your doctor and pharmacist about all of your drugs (prescription or OTC, natural products, vitamins) and health problems. You must check to make sure that it is safe for you to take this drug with all of your drugs and health problems. Do not start, stop, or change the dose of any drug without checking with your doctor.  What are some things I need to know or do while I take this drug?   · Tell all of your health care providers that you take this drug. This includes your doctors, nurses, pharmacists, and dentists.  · Have your blood work checked if you are on this drug for a long time. Talk with your doctor.  · This drug may affect certain lab tests. Tell all of your health care providers and lab workers that you take this drug.  · Do not use longer than you have been told. A second infection may  happen.  · If you are allergic to sulfites, talk with your doctor. Some products have sulfites.  · This drug may make you sunburn more easily. Use care if you will be in the sun. Tell your doctor if you sunburn easily while taking this drug.  · A severe skin reaction (Guido-Greg syndrome/toxic epidermal necrolysis) may happen. It can cause severe health problems that may not go away, and sometimes death. Get medical help right away if you have signs like red, swollen, blistered, or peeling skin (with or without fever); red or irritated eyes; or sores in your mouth, throat, nose, or eyes.  · A severe and sometimes deadly reaction has happened. Most of the time, this reaction has signs like fever, rash, or swollen glands with problems in body organs like the liver, kidney, blood, heart, muscles and joints, or lungs. If you have questions, talk with the doctor.  · Birth control pills and other hormone-based birth control may not work as well to prevent pregnancy. Use some other kind of birth control also like a condom when taking this drug.  · This drug may change tooth color to yellow-gray brown if taken by children younger than 8 years old, or in the unborn baby if taken during some parts of pregnancy. If this change of tooth color happens, it will not go away. Other tooth problems have also happened. Bone growth may also be affected in these people taking this drug. If you have questions, talk with the doctor.  · Most of the time, this drug is not for use in children younger than 8 years old. However, there may be times when these children may need to take this drug. Talk with the doctor.  · Change in tooth color has also happened in adults. This has gone back to normal after this drug was stopped and teeth cleaning at a dentist's office. Talk with the doctor.  · This drug may cause harm to the unborn baby if you take it while you are pregnant. If you are pregnant or you get pregnant while taking this drug,  call your doctor right away.  · Tell your doctor if you are breast-feeding. You will need to talk about any risks to your baby.    What are some side effects that I need to call my doctor about right away?   WARNING/CAUTION: Even though it may be rare, some people may have very bad and sometimes deadly side effects when taking a drug. Tell your doctor or get medical help right away if you have any of the following signs or symptoms that may be related to a very bad side effect:  · Signs of an allergic reaction, like rash; hives; itching; red, swollen, blistered, or peeling skin with or without fever; wheezing; tightness in the chest or throat; trouble breathing, swallowing, or talking; unusual hoarseness; or swelling of the mouth, face, lips, tongue, or throat.  · Signs of liver problems like dark urine, feeling tired, not hungry, upset stomach or stomach pain, light-colored stools, throwing up, or yellow skin or eyes.  · Signs of a pancreas problem (pancreatitis) like very bad stomach pain, very bad back pain, or very bad upset stomach or throwing up.  · Chest pain or pressure or a fast heartbeat.  · Not able to pass urine or change in how much urine is passed.  · Fever, chills, or sore throat; any unexplained bruising or bleeding; or feeling very tired or weak.  · Throat irritation.  · Trouble swallowing.  · Muscle or joint pain.  · Fast breathing.  · Flushing.  · Very bad dizziness or passing out.  · Change in skin color.  · Swollen gland.  · Vaginal itching or discharge.  · Diarrhea is common with antibiotics. Rarely, a severe form called C diff-associated diarrhea (CDAD) may happen. Sometimes, this has led to a deadly bowel problem. CDAD may happen during or a few months after taking antibiotics. Call your doctor right away if you have stomach pain, cramps, or very loose, watery, or bloody stools. Check with your doctor before treating diarrhea.  · Raised pressure in the brain has happened with this drug. Most  of the time, this will go back to normal after this drug is stopped. Sometimes, loss of eyesight may happen and may not go away even after this drug is stopped. Call your doctor right away if you have a headache or eyesight problems like blurred eyesight, seeing double, or loss of eyesight.  What are some other side effects of this drug?   All drugs may cause side effects. However, many people have no side effects or only have minor side effects. Call your doctor or get medical help if any of these side effects or any other side effects bother you or do not go away:  · Diarrhea, upset stomach, or throwing up.  · Not hungry.  These are not all of the side effects that may occur. If you have questions about side effects, call your doctor. Call your doctor for medical advice about side effects.  You may report side effects to your national health agency.  You may report side effects to the FDA at 1-669.557.7936. You may also report side effects at https://www.fda.gov/medwatch.  How is this drug best taken?   Use this drug as ordered by your doctor. Read all information given to you. Follow all instructions closely.  All oral products:   · Keep taking this drug as you have been told by your doctor or other health care provider, even if you feel well.  · Some drugs may need to be taken with food or on an empty stomach. For some drugs it does not matter. Check with your pharmacist about how to take this drug.  · It is best to avoid taking this drug at the same time as milk, dairy, or other products with calcium. This drug may not work as well. If you have questions, talk with the doctor or pharmacist.  · Drink lots of noncaffeine liquids unless told to drink less liquid by your doctor.  · Do not take bismuth (Pepto-Bismol®), calcium, iron, magnesium, zinc, multivitamins with minerals, colestipol, cholestyramine, didanosine, or antacids within 2 hours of this drug.  Tablets and capsules:   · Take with a full glass of  water.  · Do not lie down after taking this drug. This will help lower the chance of esophagus irritation. Ask your pharmacist how long before you can lie down after taking this drug.  Delayed-release tablets:   · Swallow whole. Do not chew or crush.  · The tablet may be broken if the doctor tells you to.  · You may sprinkle contents of tablet on applesauce. Be careful to break the tablet without crushing the pellets. Do not chew, crush, or damage the contents of the tablet.  · Do not mix with hot applesauce.  · If mixed, swallow the mixed drug right away. Do not store for use at a later time.  All liquid products:   · Measure liquid doses carefully. Use the measuring device that comes with this drug. If there is none, ask the pharmacist for a device to measure this drug.  Liquid (suspension):   · Shake well before use.  Injection:   · It is given as an infusion into a vein over a period of time.  What do I do if I miss a dose?   All oral products:   · Take a missed dose as soon as you think about it.  · If it is close to the time for your next dose, skip the missed dose and go back to your normal time.  · Do not take 2 doses at the same time or extra doses.  Injection:   · Call your doctor to find out what to do.  How do I store and/or throw out this drug?   All oral products:   · Store at room temperature protected from light. Store in a dry place. Do not store in a bathroom.  · Do not take this drug if it is outdated.  · Do not take this drug if it has not been stored as you have been told.  Liquid (syrup):   · Throw away any unused part of this drug.  Liquid (suspension):   · Store liquid (suspension) at room temperature. Throw away any part not used after 2 weeks.  Injection:   · If you need to store this drug at home, talk with your doctor, nurse, or pharmacist about how to store it.  All products:   · Keep all drugs in a safe place. Keep all drugs out of the reach of children and pets.  · Throw away unused or   drugs. Do not flush down a toilet or pour down a drain unless you are told to do so. Check with your pharmacist if you have questions about the best way to throw out drugs. There may be drug take-back programs in your area.  General drug facts   · If your symptoms or health problems do not get better or if they become worse, call your doctor.  · Do not share your drugs with others and do not take anyone else's drugs.  · Some drugs may have another patient information leaflet. If you have any questions about this drug, please talk with your doctor, nurse, pharmacist, or other health care provider.  · Some drugs may have another patient information leaflet. Check with your pharmacist. If you have any questions about this drug, please talk with your doctor, nurse, pharmacist, or other health care provider.  · If you think there has been an overdose, call your poison control center or get medical care right away. Be ready to tell or show what was taken, how much, and when it happened.    Consumer Information Use and Disclaimer   This generalized information is a limited summary of diagnosis, treatment, and/or medication information. It is not meant to be comprehensive and should be used as a tool to help the user understand and/or assess potential diagnostic and treatment options. It does NOT include all information about conditions, treatments, medications, side effects, or risks that may apply to a specific patient. It is not intended to be medical advice or a substitute for the medical advice, diagnosis, or treatment of a health care provider based on the health care provider's examination and assessment of a patient's specific and unique circumstances. Patients must speak with a health care provider for complete information about their health, medical questions, and treatment options, including any risks or benefits regarding use of medications. This information does not endorse any treatments or medications  as safe, effective, or approved for treating a specific patient. [employer] and its affiliates disclaim any warranty or liability relating to this information or the use thereof. The use of this information is governed by the Terms of Use, available at https://www.[employer]/en/solutions/lexicomp/about/lucinda.  Last Reviewed Date   2020-03-18  Copyright   © 2021 [employer] and its affiliates and/or licensors. All rights reserved.

## 2022-01-11 NOTE — PROGRESS NOTES
Subjective:       Patient ID: Valdemar Allen is a 32 y.o. male.    Chief Complaint: No chief complaint on file.    Patient presents as a follow-up to 2 separate emergency room visits, January 3rd and January 7th.  He had a plethora of complaints including headache, congestion, cough, chest pain, abdominal pain.  He apparently had cardiovascular workup with laboratory and EKG.  Was prescribed Augmentin on the 1st occasion and apparently did not tolerate well.  On the 2nd occasion he had CTA which did not show any pulmonary problems.  Troponins were negative.  He has had cardiac stress testing within the past couple of years as well.  Today complains of a headache to the right forehead.  Described as a burning sensation.  Some congestion and he points to the nasal area.  He is currently on no antibiotics but taking anti-inflammatory and Tylenol.  Headache is moderate quality, 4 over 10.     Review of Systems   Constitutional: Positive for fatigue. Negative for chills, fever and unexpected weight change.   HENT: Positive for congestion, sinus pressure and sinus pain. Negative for trouble swallowing.    Eyes: Negative for redness and visual disturbance.   Respiratory: Positive for cough and chest tightness. Negative for shortness of breath.    Cardiovascular: Positive for chest pain and palpitations. Negative for leg swelling.   Gastrointestinal: Negative for abdominal pain and blood in stool.   Genitourinary: Negative for difficulty urinating and hematuria.   Musculoskeletal: Negative for arthralgias, back pain, gait problem, joint swelling, myalgias and neck pain.   Skin: Negative for color change and rash.   Neurological: Positive for light-headedness and headaches. Negative for tremors, speech difficulty, weakness and numbness.   Hematological: Negative for adenopathy. Does not bruise/bleed easily.   Psychiatric/Behavioral: Negative for behavioral problems, confusion and sleep disturbance. The patient is not  nervous/anxious.        Objective:      Physical Exam  Vitals and nursing note reviewed.   Constitutional:       General: He is not in acute distress.     Appearance: He is well-developed and well-nourished. He is not diaphoretic.   HENT:      Head: Normocephalic.      Right Ear: Tympanic membrane, ear canal and external ear normal.      Left Ear: Tympanic membrane, ear canal and external ear normal.      Nose: Nose normal.      Mouth/Throat:      Mouth: Oropharynx is clear and moist and mucous membranes are normal.      Pharynx: Uvula midline. No oropharyngeal exudate.   Eyes:      General: No scleral icterus.        Right eye: No discharge.         Left eye: No discharge.      Conjunctiva/sclera: Conjunctivae normal.   Neck:      Thyroid: No thyromegaly.   Cardiovascular:      Rate and Rhythm: Normal rate and regular rhythm.      Pulses: Intact distal pulses.      Heart sounds: Normal heart sounds. No murmur heard.  No friction rub. No gallop.    Pulmonary:      Effort: Pulmonary effort is normal. No respiratory distress.      Breath sounds: No wheezing or rales.   Chest:      Chest wall: No tenderness.   Abdominal:      General: There is no distension.      Palpations: Abdomen is soft.      Tenderness: There is no abdominal tenderness. There is no guarding or rebound.   Musculoskeletal:         General: No edema.      Cervical back: Normal range of motion and neck supple.   Lymphadenopathy:      Cervical: No cervical adenopathy.   Skin:     General: Skin is warm and dry.      Findings: No rash.   Neurological:      Mental Status: He is alert and oriented to person, place, and time.         Assessment:       1. Bacterial sinusitis    2. Nonintractable headache, unspecified chronicity pattern, unspecified headache type    3. Gastroesophageal reflux disease, unspecified whether esophagitis present    4. Atypical chest pain    5. Elevated lipase    6. Acute sinusitis, recurrence not specified, unspecified location     7. Anosmia    8. History of 2019 novel coronavirus disease (COVID-19)        Plan:     Medication List with Changes/Refills   New Medications    DOXYCYCLINE (VIBRA-TABS) 100 MG TABLET    Take 1 tablet (100 mg total) by mouth 2 (two) times daily. Substitute monohydrate depending on insurance coverage for 7 days   Current Medications    ACETAMINOPHEN (TYLENOL) 325 MG TABLET    Take 325 mg by mouth every 6 (six) hours as needed for Pain.    ALBUTEROL (VENTOLIN HFA) 90 MCG/ACTUATION INHALER    Inhale 2 puffs into the lungs every 6 (six) hours as needed for Wheezing or Shortness of Breath. Rescue    CETIRIZINE (ZYRTEC) 10 MG TABLET    Take 1 tablet (10 mg total) by mouth once daily.    FLUTICASONE PROPIONATE (FLONASE) 50 MCG/ACTUATION NASAL SPRAY    1 spray (50 mcg total) by Each Nostril route once daily.    IBUPROFEN (ADVIL,MOTRIN) 200 MG TABLET    Take 400 mg by mouth every 6 (six) hours as needed for Pain.    PANTOPRAZOLE (PROTONIX) 40 MG TABLET    Take 1 tablet (40 mg total) by mouth once daily.   Discontinued Medications    AMOXICILLIN-CLAVULANATE 875-125MG (AUGMENTIN) 875-125 MG PER TABLET    Take 1 tablet by mouth 2 (two) times daily. for 10 days     Diagnoses and all orders for this visit:    Bacterial sinusitis  -     CT Sinuses without Contrast; Future    Nonintractable headache, unspecified chronicity pattern, unspecified headache type  -     CT Sinuses without Contrast; Future    Gastroesophageal reflux disease, unspecified whether esophagitis present  -     Ambulatory referral/consult to Gastroenterology; Future    Atypical chest pain  -     Ambulatory referral/consult to Cardiology; Future    Elevated lipase  -     Lipase; Future  -     Ambulatory referral/consult to Gastroenterology; Future    Acute sinusitis, recurrence not specified, unspecified location  -     CT Sinuses without Contrast; Future    Anosmia  -     Ambulatory referral/consult to ENT; Future    History of 2019 novel coronavirus disease  (COVID-19)  Comments:  June 2021  Orders:  -     Ambulatory referral/consult to ENT; Future    Other orders  -     doxycycline (VIBRA-TABS) 100 MG tablet; Take 1 tablet (100 mg total) by mouth 2 (two) times daily. Substitute monohydrate depending on insurance coverage for 7 days      See meds, orders, follow up, routing and instructions sections of encounter and AVS. Discussed with patient and provided on AVS.    Current symptoms unclear etiology.  He has had a cardiovascular workup and the only significant abnormality with recent laboratory was an elevated lipase.  This was not specifically addressed to the emergency room.  Also, no return of taste or smell since COVID infection last June.  Trial of doxycycline and pill esophagitis and stomach upset as side effects were discussed.  Unclear whether his pain constitutes a sinusitis but will treat as such.  Check CT, ENT consult.  Follow ups for gastroenterology and also Cardiology to discuss his atypical chest pain.  Patient reports that he is doing better today than he was over the weekend or at his prior emergency room visits.  Again, no focal neurologic deficits present.  No evidence of shingles to the forehead, no facial skin abnormalities were noted.

## 2022-01-17 ENCOUNTER — TELEPHONE (OUTPATIENT)
Dept: INTERNAL MEDICINE | Facility: CLINIC | Age: 32
End: 2022-01-17
Payer: COMMERCIAL

## 2022-01-17 DIAGNOSIS — R74.8 ELEVATED LIPASE: Primary | ICD-10-CM

## 2022-01-17 NOTE — TELEPHONE ENCOUNTER
Called patient and discussed labs and or test results. Patient expressed understanding and had the opportunity to ask questions. Any questions were answered. See meds, orders, follow up and instructions sections of encounter.    Specific issues include:  Mild lipase elevation - has appt w/ GI next month. Main sx is gas at this time assc w/ meals. Had GI w/u last year w/ US, HIDA and EGD. Let us know if sx worsen in the meantime. Possible association w/ augmentin this time, but has stopped this,

## 2022-01-18 ENCOUNTER — HOSPITAL ENCOUNTER (OUTPATIENT)
Dept: RADIOLOGY | Facility: HOSPITAL | Age: 32
Discharge: HOME | End: 2022-01-18
Attending: FAMILY MEDICINE
Payer: COMMERCIAL

## 2022-01-18 ENCOUNTER — OFFICE VISIT (OUTPATIENT)
Dept: OTOLARYNGOLOGY | Facility: CLINIC | Age: 32
End: 2022-01-18
Payer: COMMERCIAL

## 2022-01-18 VITALS — HEIGHT: 67 IN | WEIGHT: 192.88 LBS | BODY MASS INDEX: 30.27 KG/M2

## 2022-01-18 DIAGNOSIS — R43.8 HYPOSMIA: ICD-10-CM

## 2022-01-18 DIAGNOSIS — R51.9 NONINTRACTABLE HEADACHE, UNSPECIFIED CHRONICITY PATTERN, UNSPECIFIED HEADACHE TYPE: ICD-10-CM

## 2022-01-18 DIAGNOSIS — B96.89 BACTERIAL SINUSITIS: ICD-10-CM

## 2022-01-18 DIAGNOSIS — J01.90 ACUTE SINUSITIS, RECURRENCE NOT SPECIFIED, UNSPECIFIED LOCATION: ICD-10-CM

## 2022-01-18 DIAGNOSIS — J32.9 BACTERIAL SINUSITIS: ICD-10-CM

## 2022-01-18 DIAGNOSIS — Z86.16 HISTORY OF 2019 NOVEL CORONAVIRUS DISEASE (COVID-19): ICD-10-CM

## 2022-01-18 PROCEDURE — 1159F PR MEDICATION LIST DOCUMENTED IN MEDICAL RECORD: ICD-10-PCS | Mod: CPTII,S$GLB,, | Performed by: PHYSICIAN ASSISTANT

## 2022-01-18 PROCEDURE — 1160F RVW MEDS BY RX/DR IN RCRD: CPT | Mod: CPTII,S$GLB,, | Performed by: PHYSICIAN ASSISTANT

## 2022-01-18 PROCEDURE — 99203 OFFICE O/P NEW LOW 30 MIN: CPT | Mod: S$GLB,,, | Performed by: PHYSICIAN ASSISTANT

## 2022-01-18 PROCEDURE — 1159F MED LIST DOCD IN RCRD: CPT | Mod: CPTII,S$GLB,, | Performed by: PHYSICIAN ASSISTANT

## 2022-01-18 PROCEDURE — 3008F BODY MASS INDEX DOCD: CPT | Mod: CPTII,S$GLB,, | Performed by: PHYSICIAN ASSISTANT

## 2022-01-18 PROCEDURE — 70486 CT MAXILLOFACIAL W/O DYE: CPT | Mod: TC

## 2022-01-18 PROCEDURE — 99203 PR OFFICE/OUTPT VISIT, NEW, LEVL III, 30-44 MIN: ICD-10-PCS | Mod: S$GLB,,, | Performed by: PHYSICIAN ASSISTANT

## 2022-01-18 PROCEDURE — 1160F PR REVIEW ALL MEDS BY PRESCRIBER/CLIN PHARMACIST DOCUMENTED: ICD-10-PCS | Mod: CPTII,S$GLB,, | Performed by: PHYSICIAN ASSISTANT

## 2022-01-18 PROCEDURE — 3008F PR BODY MASS INDEX (BMI) DOCUMENTED: ICD-10-PCS | Mod: CPTII,S$GLB,, | Performed by: PHYSICIAN ASSISTANT

## 2022-01-18 NOTE — PROGRESS NOTES
[facility]    Subjective:      Patient: Valdemar Allen Patient PCP: Kyler Rodriguez MD         :  1990     Sex:  male      MRN:  [id number]          Date of Visit: 2022      Chief Complaint: Anosmia    Patient ID: Valdemar Allen is a 32 y.o. male who was referred to me by Dr. Kyler Rodriguez in consultation for anosmia. Pt tested positive for covid19 virus around 6 months ago. Pt states that he lost around 50% of taste and smell due to covid19 infection, which has never recovered. Pt did not have issues with taste or smell prior. Pt recently seen at ED 2022 for chest pain and was found to have bilateral serous effusion and acute sinusitis. Pt states that ear issues and sinus pain have resolved. He continues to have some mild frontal sinus pressure. Pt endorses some nasal congestion today in office. Pt was using flonase 1 puff to each nostril daily, but d/c'd a couple of days ago. Current sinonasal medications include none at present.  No seasonal allergies. He does not recall previously having allergy testing.  He denies a history of asthma.  He has not had sinonasal surgery.    He does not recall a prior history of nasal trauma.    Review of Systems   HENT: Positive for congestion and sinus pressure (frontal). Negative for ear discharge, ear pain, hearing loss and rhinorrhea.    Psychiatric/Behavioral: Negative for agitation, behavioral problems, confusion, decreased concentration and dysphoric mood.      Past Medical History  He has a past medical history of Chronic prostatitis, Elevated transaminase measurement, Herniated disc, Male pelvic pain, Pelvic muscle wasting, and Prostatitis.    Family History  His family history includes Glaucoma in his mother.    Past Surgical History:   Procedure Laterality Date    ESOPHAGOGASTRODUODENOSCOPY N/A 2020    Procedure: EGD (ESOPHAGOGASTRODUODENOSCOPY);  Surgeon: Carmela Messina MD;  Location: [facility];  Service: Endoscopy;  Laterality: N/A;     TONSILLECTOMY       Social History     Tobacco Use    Smoking status: Never Smoker    Smokeless tobacco: Never Used   Substance and Sexual Activity    Alcohol use: Yes    Drug use: No    Sexual activity: Not Currently     Medications  He has a current medication list which includes the following prescription(s): acetaminophen, albuterol, cetirizine, doxycycline, fluticasone propionate, ibuprofen, and pantoprazole.    Review of patient's allergies indicates:   Allergen Reactions    Augmentin [amoxicillin-pot clavulanate] Palpitations and Other (See Comments)     Patient states that he feels like is having a heart attack      All medications, allergies, and past history have been reviewed.    Objective:      Vitals:  Vitals - 1 value per visit 1/11/2022 1/18/2022 1/18/2022   SYSTOLIC 118 - -   DIASTOLIC 72 - -   Pulse 78 - -   Temp - - -   Resp - - -   SPO2 98 - -   Weight (lb) 194.23 - 192.9   Weight (kg) 88.1 - 87.5   Height 67 - 67   BMI (Calculated) 30.4 - 30.2   VISIT REPORT - - -   Pain Score  - 0 -       Body surface area is 2.03 meters squared.  Physical Exam  Constitutional:       General: He is not in acute distress.     Appearance: Normal appearance. He is not ill-appearing.   HENT:      Head: Normocephalic and atraumatic.      Right Ear: Tympanic membrane, ear canal and external ear normal.      Left Ear: Tympanic membrane, ear canal and external ear normal.      Nose: Septal deviation (mild left) present.      Mouth/Throat:      Lips: Pink.      Mouth: Mucous membranes are moist.      Tongue: No lesions.      Palate: No lesions.      Pharynx: Oropharynx is clear. Uvula midline. No pharyngeal swelling, oropharyngeal exudate, posterior oropharyngeal erythema or uvula swelling.      Tonsils: No tonsillar exudate or tonsillar abscesses.   Eyes:      General:         Right eye: No discharge.         Left eye: No discharge.      Extraocular Movements: Extraocular movements intact.       Conjunctiva/sclera: Conjunctivae normal.   Pulmonary:      Effort: Pulmonary effort is normal.   Neurological:      General: No focal deficit present.      Mental Status: He is alert and oriented to person, place, and time. Mental status is at baseline.   Psychiatric:         Mood and Affect: Mood normal.         Behavior: Behavior normal.         Thought Content: Thought content normal.         Judgment: Judgment normal.       Labs:  WBC   Date Value Ref Range Status   01/07/2022 7.01 3.90 - 12.70 K/uL Final     Eosinophil %   Date Value Ref Range Status   01/07/2022 2.9 0.0 - 8.0 % Final     Eos #   Date Value Ref Range Status   01/07/2022 0.2 0.0 - 0.5 K/uL Final     Platelets   Date Value Ref Range Status   01/07/2022 213 150 - 450 K/uL Final     Glucose   Date Value Ref Range Status   01/07/2022 141 (H) 70 - 110 mg/dL Final     All lab results, imaging results, and data have been reviewed.    Assessment:        ICD-10-CM ICD-9-CM   1. Hyposmia  R43.8 781.1   2. History of 2019 novel coronavirus disease (COVID-19)  Z86.16 V12.09              Plan:      Hyposmia  -     Secondary to viral infection. Pt had covid19 virus around 6 months ago and lost around 50% of his smell and has not had any improvement in smell since. I advised pt that it is good that he saw me prior to 12 months after loss of smell secondary to viral infection, as after 12 months you will be left with residual smell function without future improvement. He is 6 months out and I have given him a sheet on olfactory training in office to help him regain some of his smell back. Pt is to follow up in office in 6 months to re-assess smell. Pt knows he can follow up sooner if he has worsening of smell or for general ENT concerns.   - Pt is to start back on flonase for nasal congestion.    History of 2019 novel coronavirus disease (COVID-19)  Comments:  June 2021

## 2022-01-20 ENCOUNTER — TELEPHONE (OUTPATIENT)
Dept: INTERNAL MEDICINE | Facility: CLINIC | Age: 32
End: 2022-01-20
Payer: COMMERCIAL

## 2022-01-20 DIAGNOSIS — J32.9 CHRONIC SINUSITIS, UNSPECIFIED LOCATION: Primary | ICD-10-CM

## 2022-01-21 ENCOUNTER — TELEPHONE (OUTPATIENT)
Dept: OTOLARYNGOLOGY | Facility: CLINIC | Age: 32
End: 2022-01-21
Payer: COMMERCIAL

## 2022-01-21 NOTE — TELEPHONE ENCOUNTER
Called pt and informed him that sinus CT shows very mild paranasal sinus disease and that this is not the cause of his hyposmia. Hyposmia occurred acutely during covid19 infection and never resolved. Advised pt to do neilmed sinus rinse at lease once daily for sinus hygiene. Pt verbalized understanding.

## 2022-01-23 ENCOUNTER — TELEPHONE (OUTPATIENT)
Dept: INTERNAL MEDICINE | Facility: CLINIC | Age: 32
End: 2022-01-23
Payer: COMMERCIAL

## 2022-01-23 NOTE — TELEPHONE ENCOUNTER
----- Message from James Romeo PA-C sent at 1/21/2022 12:39 PM CST -----  Hey,     Thank you for reaching out. I just reviewed CT scan. It is very mild. No need for surgical intervention and I don't believe it is the cause of his hyposmia. I will reach out to pt and have him do neilmed sinus rinse twice daily for sinus hygiene. I hope you have a great day!  ----- Message -----  From: Kyler Caballero MD  Sent: 1/20/2022   9:00 PM CST  To: James Romeo PA-C    Sinus CT scan I think was done after you saw him, chronic sinus changes, please advise me if you need to see him again or change treatment plan, thank you

## 2022-01-24 ENCOUNTER — OFFICE VISIT (OUTPATIENT)
Dept: CARDIOLOGY | Facility: CLINIC | Age: 32
End: 2022-01-24
Attending: FAMILY MEDICINE
Payer: COMMERCIAL

## 2022-01-24 VITALS
HEIGHT: 67 IN | BODY MASS INDEX: 30.13 KG/M2 | DIASTOLIC BLOOD PRESSURE: 77 MMHG | OXYGEN SATURATION: 96 % | HEART RATE: 75 BPM | SYSTOLIC BLOOD PRESSURE: 140 MMHG | WEIGHT: 192 LBS

## 2022-01-24 DIAGNOSIS — Z13.220 SCREENING CHOLESTEROL LEVEL: ICD-10-CM

## 2022-01-24 DIAGNOSIS — Z91.89 AT RISK FOR CORONARY ARTERY DISEASE: ICD-10-CM

## 2022-01-24 DIAGNOSIS — Z86.16 HISTORY OF 2019 NOVEL CORONAVIRUS DISEASE (COVID-19): Primary | ICD-10-CM

## 2022-01-24 DIAGNOSIS — R07.89 ATYPICAL CHEST PAIN: ICD-10-CM

## 2022-01-24 PROCEDURE — 99999 PR PBB SHADOW E&M-EST. PATIENT-LVL III: CPT | Mod: PBBFAC,,, | Performed by: INTERNAL MEDICINE

## 2022-01-24 PROCEDURE — 3008F BODY MASS INDEX DOCD: CPT | Mod: CPTII,S$GLB,, | Performed by: INTERNAL MEDICINE

## 2022-01-24 PROCEDURE — 99204 PR OFFICE/OUTPT VISIT, NEW, LEVL IV, 45-59 MIN: ICD-10-PCS | Mod: S$GLB,,, | Performed by: INTERNAL MEDICINE

## 2022-01-24 PROCEDURE — 3078F PR MOST RECENT DIASTOLIC BLOOD PRESSURE < 80 MM HG: ICD-10-PCS | Mod: CPTII,S$GLB,, | Performed by: INTERNAL MEDICINE

## 2022-01-24 PROCEDURE — 3077F SYST BP >= 140 MM HG: CPT | Mod: CPTII,S$GLB,, | Performed by: INTERNAL MEDICINE

## 2022-01-24 PROCEDURE — 99204 OFFICE O/P NEW MOD 45 MIN: CPT | Mod: S$GLB,,, | Performed by: INTERNAL MEDICINE

## 2022-01-24 PROCEDURE — 3078F DIAST BP <80 MM HG: CPT | Mod: CPTII,S$GLB,, | Performed by: INTERNAL MEDICINE

## 2022-01-24 PROCEDURE — 3077F PR MOST RECENT SYSTOLIC BLOOD PRESSURE >= 140 MM HG: ICD-10-PCS | Mod: CPTII,S$GLB,, | Performed by: INTERNAL MEDICINE

## 2022-01-24 PROCEDURE — 99999 PR PBB SHADOW E&M-EST. PATIENT-LVL III: ICD-10-PCS | Mod: PBBFAC,,, | Performed by: INTERNAL MEDICINE

## 2022-01-24 PROCEDURE — 3008F PR BODY MASS INDEX (BMI) DOCUMENTED: ICD-10-PCS | Mod: CPTII,S$GLB,, | Performed by: INTERNAL MEDICINE

## 2022-01-24 NOTE — PROGRESS NOTES
"Subjective:   Patient ID:  Valdemar Allen is a 32 y.o. male is a new patient who presents for evaluation of Atypical chest pain  ER visit for chest pain    "  32-year-old male presents with complaints of chest pain.  Patient recently seen in the emergency room for similar symptoms.  Had cardiac enzymes and EKG during his original presentation also had upper respiratory symptoms and was diagnosed with a sinus infection.  Has been taking Augmentin as directed over the past 4 days.  Presents secondary to worsening episodes of chest pain and shortness of breath.    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   Patient thinks that his chest pain and pain in the arm aggravated by anxiety.   Non smoker  Mother has HTN and high cholesterol . Uncles had CABG in 50s, another uncle had CAD  Patient is not exercise due to busy with 1 yr old baby and have been eating healthy.     Stress Echo 2019    · The patient reported no symptoms during the stress test.  · Normal left ventricular systolic function. The estimated ejection fraction is 55%  · Normal right ventricular systolic function.  · Normal LV diastolic function.  · The patient's exercise capacity was normal.  · The EKG portion of this study is negative for myocardial ischemia.  · The stress echo portion of this study is negative for myocardial ischemia.  · This study overall is negative for myocardial ischemia.     EKG: NSR.     Patient Active Problem List   Diagnosis    Elevated lipase    Eye refraction disorder - Both Eyes    GERD (gastroesophageal reflux disease)    Lumbar disc disease    Atypical chest pain    History of 2019 novel coronavirus disease (COVID-19)    Anosmia     BP (!) 140/77 (BP Location: Left arm, Patient Position: Sitting, BP Method: X-Large (Automatic))   Pulse 75   Ht 5' 7" (1.702 m)   Wt 87.1 kg (192 lb 0.3 oz)   SpO2 96%   BMI 30.07 kg/m²   Body mass index is 30.07 kg/m².  CrCl cannot be calculated " (Patient's most recent lab result is older than the maximum 7 days allowed.).    Lab Results   Component Value Date     01/07/2022    K 3.9 01/07/2022    CL 98 01/07/2022    CO2 26 01/07/2022    BUN 12 01/07/2022    CREATININE 0.9 01/07/2022     (H) 01/07/2022    HGBA1C 5.3 05/04/2021    MG 1.8 01/07/2022    AST 29 01/07/2022    ALT 53 (H) 01/07/2022    ALBUMIN 5.1 01/07/2022    PROT 8.7 (H) 01/07/2022    BILITOT 0.9 01/07/2022    WBC 7.01 01/07/2022    HGB 15.6 01/07/2022    HCT 48.3 01/07/2022    MCV 82 01/07/2022     01/07/2022    INR 1.1 01/07/2022    TSH 0.600 01/07/2022    CHOL 200 (H) 05/04/2021    HDL 40 05/04/2021    LDLCALC 135.8 05/04/2021    TRIG 121 05/04/2021       Current Outpatient Medications   Medication Sig    acetaminophen (TYLENOL) 325 MG tablet Take 325 mg by mouth every 6 (six) hours as needed for Pain.     No current facility-administered medications for this visit.       Review of Systems   Constitutional: Negative for chills, decreased appetite, malaise/fatigue, night sweats, weight gain and weight loss.   Eyes: Negative for blurred vision, double vision, visual disturbance and visual halos.   Cardiovascular: Positive for chest pain. Negative for claudication, cyanosis, dyspnea on exertion, irregular heartbeat, leg swelling, near-syncope, orthopnea, palpitations, paroxysmal nocturnal dyspnea and syncope.   Respiratory: Negative for cough, hemoptysis, snoring, sputum production and wheezing.    Endocrine: Negative for cold intolerance, heat intolerance, polydipsia and polyphagia.   Hematologic/Lymphatic: Negative for adenopathy and bleeding problem. Does not bruise/bleed easily.   Skin: Negative for flushing, itching, poor wound healing and rash.   Musculoskeletal: Negative for arthritis, back pain, falls, gout, joint pain, joint swelling, muscle cramps, muscle weakness, myalgias, neck pain and stiffness.   Gastrointestinal: Negative for bloating, abdominal pain,  anorexia, diarrhea, dysphagia, excessive appetite, flatus, hematemesis, jaundice, melena and nausea.   Genitourinary: Negative for hesitancy and incomplete emptying.   Neurological: Negative for aphonia, brief paralysis, difficulty with concentration, disturbances in coordination, excessive daytime sleepiness, dizziness, focal weakness, light-headedness, loss of balance and weakness.   Psychiatric/Behavioral: Negative for altered mental status, depression, hallucinations, hypervigilance, memory loss, substance abuse and suicidal ideas. The patient does not have insomnia and is not nervous/anxious.        Objective:   Physical Exam  Constitutional:       General: He is not in acute distress.     Appearance: He is well-developed and well-nourished. He is not diaphoretic.   HENT:      Head: Normocephalic and atraumatic.      Nose: Nose normal.      Mouth/Throat:      Pharynx: No oropharyngeal exudate.   Eyes:      General: No scleral icterus.        Right eye: No discharge.         Left eye: No discharge.      Extraocular Movements: EOM normal.      Conjunctiva/sclera: Conjunctivae normal.      Pupils: Pupils are equal, round, and reactive to light.   Neck:      Thyroid: No thyromegaly.      Vascular: No JVD.      Trachea: No tracheal deviation.   Cardiovascular:      Rate and Rhythm: Normal rate and regular rhythm.      Pulses: Intact distal pulses.      Heart sounds: Normal heart sounds. No murmur heard.  No friction rub. No gallop.    Pulmonary:      Effort: Pulmonary effort is normal. No respiratory distress.      Breath sounds: Normal breath sounds. No stridor. No wheezing or rales.   Chest:      Chest wall: No tenderness.   Abdominal:      General: Bowel sounds are normal. There is no distension.      Palpations: Abdomen is soft. There is no mass.      Tenderness: There is no abdominal tenderness.   Musculoskeletal:         General: No tenderness or edema.      Cervical back: Normal range of motion and neck  supple.   Lymphadenopathy:      Cervical: No cervical adenopathy.   Skin:     General: Skin is warm.      Coloration: Skin is not pale.      Findings: No erythema or rash.   Neurological:      Mental Status: He is alert and oriented to person, place, and time.      Cranial Nerves: No cranial nerve deficit.      Motor: No abnormal muscle tone.      Coordination: Coordination normal.      Deep Tendon Reflexes: Reflexes normal.   Psychiatric:         Mood and Affect: Mood and affect normal.         Behavior: Behavior normal.         Thought Content: Thought content normal.         Judgment: Judgment normal.         Assessment:     1. History of 2019 novel coronavirus disease (COVID-19)    2. Atypical chest pain    3. Screening cholesterol level    4. At risk for coronary artery disease        Plan:   Non cardiac chest pain, reassurance. Patient to get lipids next year and see me. Will consider calcium score for risk stratification,. Diet and exercise. Monitor BP send me BP through my portal and manage anxiety (likely sitautional)  Counseled on importance of heart healthy diet low in saturated and trans fat and salt as well gradually starting a regular aerobic exercise regimen with goal of 30min 5x/week. Recommend BP diary. Call if systolic BP > 130 mmHg on checking repeatedly    Valdemar was seen today for atypical chest pain.    Diagnoses and all orders for this visit:    History of 2019 novel coronavirus disease (COVID-19)    Atypical chest pain  -     Ambulatory referral/consult to Cardiology    Screening cholesterol level  -     Lipid Panel; Future    At risk for coronary artery disease  -     Lipid Panel; Future      RTC 1 yr

## 2022-01-31 ENCOUNTER — LAB VISIT (OUTPATIENT)
Dept: LAB | Facility: HOSPITAL | Age: 32
End: 2022-01-31
Attending: INTERNAL MEDICINE
Payer: COMMERCIAL

## 2022-01-31 DIAGNOSIS — Z91.89 AT RISK FOR CORONARY ARTERY DISEASE: ICD-10-CM

## 2022-01-31 DIAGNOSIS — Z13.220 SCREENING CHOLESTEROL LEVEL: ICD-10-CM

## 2022-01-31 LAB
CHOLEST SERPL-MCNC: 179 MG/DL (ref 120–199)
CHOLEST/HDLC SERPL: 4.5 {RATIO} (ref 2–5)
HDLC SERPL-MCNC: 40 MG/DL (ref 40–75)
HDLC SERPL: 22.3 % (ref 20–50)
LDLC SERPL CALC-MCNC: 112.8 MG/DL (ref 63–159)
NONHDLC SERPL-MCNC: 139 MG/DL
TRIGL SERPL-MCNC: 131 MG/DL (ref 30–150)

## 2022-01-31 PROCEDURE — 80061 LIPID PANEL: CPT | Performed by: INTERNAL MEDICINE

## 2022-01-31 PROCEDURE — 36415 COLL VENOUS BLD VENIPUNCTURE: CPT | Mod: PO | Performed by: INTERNAL MEDICINE

## 2022-02-01 ENCOUNTER — TELEPHONE (OUTPATIENT)
Dept: CARDIOLOGY | Facility: CLINIC | Age: 32
End: 2022-02-01
Payer: COMMERCIAL

## 2022-02-24 ENCOUNTER — OFFICE VISIT (OUTPATIENT)
Dept: GASTROENTEROLOGY | Facility: CLINIC | Age: 32
End: 2022-02-24
Attending: FAMILY MEDICINE
Payer: COMMERCIAL

## 2022-02-24 VITALS
HEART RATE: 85 BPM | BODY MASS INDEX: 29.65 KG/M2 | DIASTOLIC BLOOD PRESSURE: 86 MMHG | HEIGHT: 67 IN | SYSTOLIC BLOOD PRESSURE: 127 MMHG | WEIGHT: 188.94 LBS

## 2022-02-24 DIAGNOSIS — R74.8 ELEVATED LIPASE: ICD-10-CM

## 2022-02-24 DIAGNOSIS — K21.9 GASTROESOPHAGEAL REFLUX DISEASE, UNSPECIFIED WHETHER ESOPHAGITIS PRESENT: ICD-10-CM

## 2022-02-24 PROCEDURE — 3074F SYST BP LT 130 MM HG: CPT | Mod: CPTII,S$GLB,, | Performed by: INTERNAL MEDICINE

## 2022-02-24 PROCEDURE — 1159F MED LIST DOCD IN RCRD: CPT | Mod: CPTII,S$GLB,, | Performed by: INTERNAL MEDICINE

## 2022-02-24 PROCEDURE — 1159F PR MEDICATION LIST DOCUMENTED IN MEDICAL RECORD: ICD-10-PCS | Mod: CPTII,S$GLB,, | Performed by: INTERNAL MEDICINE

## 2022-02-24 PROCEDURE — 99214 OFFICE O/P EST MOD 30 MIN: CPT | Mod: S$GLB,,, | Performed by: INTERNAL MEDICINE

## 2022-02-24 PROCEDURE — 3079F DIAST BP 80-89 MM HG: CPT | Mod: CPTII,S$GLB,, | Performed by: INTERNAL MEDICINE

## 2022-02-24 PROCEDURE — 99999 PR PBB SHADOW E&M-EST. PATIENT-LVL III: CPT | Mod: PBBFAC,,, | Performed by: INTERNAL MEDICINE

## 2022-02-24 PROCEDURE — 3074F PR MOST RECENT SYSTOLIC BLOOD PRESSURE < 130 MM HG: ICD-10-PCS | Mod: CPTII,S$GLB,, | Performed by: INTERNAL MEDICINE

## 2022-02-24 PROCEDURE — 3008F BODY MASS INDEX DOCD: CPT | Mod: CPTII,S$GLB,, | Performed by: INTERNAL MEDICINE

## 2022-02-24 PROCEDURE — 99999 PR PBB SHADOW E&M-EST. PATIENT-LVL III: ICD-10-PCS | Mod: PBBFAC,,, | Performed by: INTERNAL MEDICINE

## 2022-02-24 PROCEDURE — 99214 PR OFFICE/OUTPT VISIT, EST, LEVL IV, 30-39 MIN: ICD-10-PCS | Mod: S$GLB,,, | Performed by: INTERNAL MEDICINE

## 2022-02-24 PROCEDURE — 3079F PR MOST RECENT DIASTOLIC BLOOD PRESSURE 80-89 MM HG: ICD-10-PCS | Mod: CPTII,S$GLB,, | Performed by: INTERNAL MEDICINE

## 2022-02-24 PROCEDURE — 3008F PR BODY MASS INDEX (BMI) DOCUMENTED: ICD-10-PCS | Mod: CPTII,S$GLB,, | Performed by: INTERNAL MEDICINE

## 2022-02-24 RX ORDER — PANTOPRAZOLE SODIUM 40 MG/1
40 TABLET, DELAYED RELEASE ORAL DAILY
Qty: 90 TABLET | Refills: 3 | Status: SHIPPED | OUTPATIENT
Start: 2022-02-24 | End: 2023-02-24

## 2022-02-24 NOTE — PROGRESS NOTES
"Reason for visit: Excessive belching    HPI:  is a 32 year old gentleman with no known significant past medical history of presented to the ED in mid January 2022 with chief complaint of left-sided chest discomfort.He was in his usual state of health until new year's Aleksandra when he started experiencing symptoms consistent with sinusitis.  At that time he also had some left-sided chest discomfort as well.  It is intermittent in nature.  Reported chest pain as burning in nature and located over the precordial region with radiation to the left axilla.  Also has associated heaviness in the head.  He was prescribed amoxicillin clavulanate for sinusitis.  He felt better for a couple of days only for symptoms to get worse on the day of presentation. Had been taking double dose of Augmentin for 3 days when he realized the error. Chest pain was worse with exertion however he feels exhausted after ambulating for about 15 minutes.  He also felt weakness in both his legs.  Denied shortness of breath, diaphoresis or lightheadedness.  In addition to antibiotics he had been trying cetirizine, Tylenol p.m. and famotidine with minimal relief.  His blood pressure was borderline elevated. No known history essential hypertension.  Family history is notable for essential hypertension and hyperlipidemia in his mother.  No known history of premature CAD.  Patient is not a smoker.    Previously seen  in 2020 April with complaints of belching which could occur more than 100 x per day.  He would describe heartburn with all foods and liquids, and get extreme belching with sodas.  He would get gas pains when he eats fried foods, and a "knot" in his lower chest that then causes "tingling" which spreads over his entire left chest and left arm.  When things get really bad he takes famotidine, but usually only for a week.  Once he feels better he stops the famotidine, but symptoms always return.  During quarantine he has been " exercising by walking more and this seems to have made things somewhat better, but for the past 2 days he has had RUQ pain that is increasing in intensity. US abdomen and HIDA scans were normal. EGD in May 2020 revealed mild esophagitis. Biopsies were unremarkable.    Cutting back on sugar helps with belching and bloating. Has been eating more healthy. Main concern is excessive belching. Fried foods also worsen symptoms and has been staying away from it. No family history of IBD, GI malignancy or Celiac disease. No dysphagia or odynophagia.            Past medical, surgical, social and family history reviewed in epic    Medication allergies reviewed in epic    Review of systems:    Constitutional:  No fever, no chills, no weight loss, appetite is normal  Eyes:  No visual changes or red eyes  ENT:  No odynophagia or hoarseness of voice  Cardiovascular:  No angina or palpitation  Respiratory:  No shortness breath or wheezing  Genitourinary:  No dysuria or frequency  Musculoskeletal:  No myalgias or arthralgias  Skin:  No pruritus or eczema  Neurologic:  No headache or seizures  Psychiatric:  No anxiety depression  Gastrointestinal:  See HPI    Physical exam:  Vitals see epic, awake, alert, oriented x3 in no acute distress    Neck:  Supple, no carotid bruit, no cervical adenopathy  Abdomen:  Obese, soft, nontender, nondistended, no masses palpable, no hepatosplenomegaly detected, bowel sounds are normal, no ascites clinically detectable  Eyes:  Conjunctivae anicteric, not injected  ENT:  Oral mucosa moist  Cardiovascular:  S1, S2 normal, no murmurs, no gallops, no abdominal bruits heard  Respiratory:  Bilateral air entry equal, no rhonchi, no crackles, normal effort  Skin:  No palmar erythema or spider angiomata  Neurologic:  No asterixis or tremors  Psychiatric:  Affect appropriate, proper judgment, proper insight, oriented to place and time  Lower extremities:  No pedal edema    Recent labs reviewed.      Impression:  Non cardiac chest pain. Excessive belching.    Recommendations:     1. Pantoprazole 40 mg daily  2. Take Smarter Enzymes capsule after each meal. Contains enzymes to help with digestion of food.

## 2022-02-24 NOTE — PATIENT INSTRUCTIONS
Please take 1-2 Smarter Enzymes capsule after each meal. Contains enzymes to help with digestion of food.

## 2022-05-24 ENCOUNTER — TELEPHONE (OUTPATIENT)
Dept: INTERNAL MEDICINE | Facility: CLINIC | Age: 32
End: 2022-05-24
Payer: COMMERCIAL

## 2022-05-24 DIAGNOSIS — M51.9 LUMBAR DISC DISEASE: Primary | ICD-10-CM

## 2022-05-24 NOTE — TELEPHONE ENCOUNTER
----- Message from Glenys Cho sent at 5/24/2022 10:04 AM CDT -----  Contact: Pt's wife- [phone]  Good morning,    Pt's wife is requesting a referral be faxed to [phone] for Physical Therapy. She is requesting a call back.    Thank you,  Glenys VICTOR  Access Navigator

## 2022-06-01 NOTE — PROGRESS NOTES
[facility]    Subjective:      Patient: Valdemar Allen Patient PCP: Kyler Rodriguez MD         :  1990     Sex:  male      MRN:  [id number]          Date of Visit: 2022      Chief Complaint: hyposmia follow up    Patient ID 2022: Valdemar Allen is a 32 y.o. male who was referred to me by Dr. Kyler Rodriguez in consultation for anosmia. Pt tested positive for covid19 virus around 6 months ago. Pt states that he lost around 50% of taste and smell due to covid19 infection, which has never recovered. Pt did not have issues with taste or smell prior. Pt recently seen at ED 2022 for chest pain and was found to have bilateral serous effusion and acute sinusitis. Pt states that ear issues and sinus pain have resolved. He continues to have some mild frontal sinus pressure. Pt endorses some nasal congestion today in office. Pt was using flonase 1 puff to each nostril daily, but d/c'd a couple of days ago. Current sinonasal medications include none at present.  No seasonal allergies. He does not recall previously having allergy testing.  He denies a history of asthma.  He has not had sinonasal surgery.    He does not recall a prior history of nasal trauma.     Interval History 2022: Pt seen at last visit with hyposmia s/p covid19 infection 2021. Pt had sinus CT WO that showed zen mild scattered mucosal thickening of the paranasal sinuses with left anterior nasal septum deviation with right posterior nasal septal deviation. Pt was advised that this was unlikely to be the cause of his smell change and encouraged to do neilmed sinus rinse once daily for sinus hygiene. Pt was advised that he had post-viral decrease in smell and was advised to start olfactory training sheet to help improve smell function and advised that sometimes decreased smell secondary to viral infection can resolve or pt's can get some improvement in smell, but sometimes smell does not improve. Pt states today in office  "that he has been doing olfactory training and has had some improvement in his smell, but his smell is not back to baseline prior to covid19 infection. Pt states that he went to urgent care 3-4 weeks ago and had right sided head pain radiating from right forehead to right side of head and some new onset nasal congestion symptoms. Pt states he felt as though he was having some sinus headache. Pt was diagnosed with "sinus inflammation" at urgent care and pt was started on flonase and zyrtec for one week and states this did help with congestion issues. Pt states that his headaches are pulsatile in nature and usually effect the right side of his head. Pt states that he has been getting headaches once an hour and he states sometimes it is a hot pain or "bending" pain in the head and radiates around the frontal area of his head to the right side of head and sometimes behind his right ear and in occipital region of the head. Pt states that these headaches have increased in frequency. Pt endorses nasal congesiton. Pt denies fever/chills today. Pt denies rhinorrhea or PND. Pt denies seasonal allergy symptoms.     Review of Systems   Constitutional: Negative for chills and fever.   HENT: Positive for congestion. Negative for postnasal drip, rhinorrhea, sinus pressure and sinus pain.    Neurological: Positive for headaches.      Past Medical History  He has a past medical history of Chronic prostatitis, Elevated transaminase measurement, Herniated disc, Male pelvic pain, Pelvic muscle wasting, and Prostatitis.    Family History  His family history includes Glaucoma in his mother; Heart disease in his maternal uncle; Hypertension in his father, maternal aunt, and maternal uncle.    Past Surgical History:   Procedure Laterality Date    ESOPHAGOGASTRODUODENOSCOPY N/A 5/21/2020    Procedure: EGD (ESOPHAGOGASTRODUODENOSCOPY);  Surgeon: Carmela Messina MD;  Location: [facility];  Service: Endoscopy;  Laterality: N/A;    " TONSILLECTOMY       Social History     Tobacco Use    Smoking status: Never Smoker    Smokeless tobacco: Never Used   Substance and Sexual Activity    Alcohol use: Not Currently    Drug use: No    Sexual activity: Not Currently     Medications  He has a current medication list which includes the following prescription(s): acetaminophen and pantoprazole.    Review of patient's allergies indicates:   Allergen Reactions    Augmentin [amoxicillin-pot clavulanate] Palpitations and Other (See Comments)     Patient states that he feels like is having a heart attack      All medications, allergies, and past history have been reviewed.    Objective:      Vitals:  Vitals - 1 value per visit 2/24/2022 6/3/2022 6/3/2022   SYSTOLIC 127 - -   DIASTOLIC 86 - -   Pulse 85 - -   Temp - - 98.7   Resp - - -   SPO2 - - -   Weight (lb) 188.93 - 188.05   Weight (kg) 85.7 - 85.3   Height 67 - 67   BMI (Calculated) 29.6 - 29.4   VISIT REPORT - - -   Pain Score  - 0 -       Body surface area is 2.01 meters squared.  Physical Exam  Constitutional:       General: He is not in acute distress.     Appearance: Normal appearance. He is not ill-appearing.   HENT:      Head: Normocephalic and atraumatic.      Right Ear: Tympanic membrane, ear canal and external ear normal.      Left Ear: Tympanic membrane, ear canal and external ear normal.      Nose: Septal deviation (left anteriorly with right posteriorly) and congestion present.      Right Sinus: No maxillary sinus tenderness or frontal sinus tenderness.      Left Sinus: No maxillary sinus tenderness or frontal sinus tenderness.      Mouth/Throat:      Lips: Pink.      Mouth: Mucous membranes are moist.      Tongue: No lesions.      Palate: No lesions.      Pharynx: Oropharynx is clear. Uvula midline. No pharyngeal swelling, oropharyngeal exudate, posterior oropharyngeal erythema or uvula swelling.      Tonsils: No tonsillar exudate or tonsillar abscesses.   Eyes:      General:          Right eye: No discharge.         Left eye: No discharge.      Extraocular Movements: Extraocular movements intact.      Conjunctiva/sclera: Conjunctivae normal.   Pulmonary:      Effort: Pulmonary effort is normal.   Neurological:      General: No focal deficit present.      Mental Status: He is alert and oriented to person, place, and time. Mental status is at baseline.   Psychiatric:         Mood and Affect: Mood normal.         Behavior: Behavior normal.         Thought Content: Thought content normal.         Judgment: Judgment normal.       Labs:  WBC   Date Value Ref Range Status   01/07/2022 7.01 3.90 - 12.70 K/uL Final     Eosinophil %   Date Value Ref Range Status   01/07/2022 2.9 0.0 - 8.0 % Final     Eos #   Date Value Ref Range Status   01/07/2022 0.2 0.0 - 0.5 K/uL Final     Platelets   Date Value Ref Range Status   01/07/2022 213 150 - 450 K/uL Final     Glucose   Date Value Ref Range Status   01/07/2022 141 (H) 70 - 110 mg/dL Final     All lab results, imaging results, and data have been reviewed.    Assessment:        ICD-10-CM ICD-9-CM   1. Nonintractable episodic headache, unspecified headache type  R51.9 784.0   2. Sinus congestion  R09.81 478.19   3. Hyposmia  R43.8 781.1            Plan:      Pt advised that he does have some congestion of nasal mucosa today in office. No sinus pressure or pain. No excruciating thunderclap headache symptoms or associated neurological symptoms such as vision change or focal neurological deficits. 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 Pt advised that post-viral anosmia/hyposmia can effect people differently. Pt advised that some people can get some smell back, while other can get all of their smell back or stay with loss of smell. Pt advised that due to increase of smell with olfactory training this is a good sign, but that after around the year hermann of  losing smell post-virally, whatever smell that comes back is usually as much smell that a pt will get back after post-viral smell loss. Pt advised to use nasonex 2 sprays to each nostril once daily and zyrtec 10mg once daily to help with nasal congestion. Pt may continue iraj med sinus rinses for sinus hygiene. Pt is to do this for 8 weeks and then as needed for nasal congestion symptoms. Pt advised that he is to follow up with neurology for evaluation of episodic atypical headache.

## 2022-06-03 ENCOUNTER — HOSPITAL ENCOUNTER (EMERGENCY)
Facility: HOSPITAL | Age: 32
Discharge: HOME | End: 2022-06-03
Attending: EMERGENCY MEDICINE
Payer: COMMERCIAL

## 2022-06-03 ENCOUNTER — OFFICE VISIT (OUTPATIENT)
Dept: OTOLARYNGOLOGY | Facility: CLINIC | Age: 32
End: 2022-06-03
Payer: COMMERCIAL

## 2022-06-03 VITALS
TEMPERATURE: 99 F | HEART RATE: 85 BPM | TEMPERATURE: 98 F | HEIGHT: 67 IN | BODY MASS INDEX: 29.52 KG/M2 | RESPIRATION RATE: 16 BRPM | DIASTOLIC BLOOD PRESSURE: 77 MMHG | OXYGEN SATURATION: 99 % | SYSTOLIC BLOOD PRESSURE: 135 MMHG | WEIGHT: 188.06 LBS

## 2022-06-03 DIAGNOSIS — R07.89 CHEST TIGHTNESS: ICD-10-CM

## 2022-06-03 DIAGNOSIS — R09.81 SINUS CONGESTION: ICD-10-CM

## 2022-06-03 DIAGNOSIS — R51.9 NONINTRACTABLE HEADACHE, UNSPECIFIED CHRONICITY PATTERN, UNSPECIFIED HEADACHE TYPE: Primary | ICD-10-CM

## 2022-06-03 DIAGNOSIS — R07.89 CHEST WALL PAIN: ICD-10-CM

## 2022-06-03 DIAGNOSIS — R43.8 HYPOSMIA: ICD-10-CM

## 2022-06-03 DIAGNOSIS — R51.9 NONINTRACTABLE EPISODIC HEADACHE, UNSPECIFIED HEADACHE TYPE: Primary | ICD-10-CM

## 2022-06-03 PROCEDURE — 1160F RVW MEDS BY RX/DR IN RCRD: CPT | Mod: CPTII,S$GLB,, | Performed by: PHYSICIAN ASSISTANT

## 2022-06-03 PROCEDURE — 3008F BODY MASS INDEX DOCD: CPT | Mod: CPTII,S$GLB,, | Performed by: PHYSICIAN ASSISTANT

## 2022-06-03 PROCEDURE — 93010 EKG 12-LEAD: ICD-10-PCS | Mod: ,,, | Performed by: INTERNAL MEDICINE

## 2022-06-03 PROCEDURE — 99999 PR PBB SHADOW E&M-EST. PATIENT-LVL III: ICD-10-PCS | Mod: PBBFAC,,, | Performed by: PHYSICIAN ASSISTANT

## 2022-06-03 PROCEDURE — 1160F PR REVIEW ALL MEDS BY PRESCRIBER/CLIN PHARMACIST DOCUMENTED: ICD-10-PCS | Mod: CPTII,S$GLB,, | Performed by: PHYSICIAN ASSISTANT

## 2022-06-03 PROCEDURE — 1159F PR MEDICATION LIST DOCUMENTED IN MEDICAL RECORD: ICD-10-PCS | Mod: CPTII,S$GLB,, | Performed by: PHYSICIAN ASSISTANT

## 2022-06-03 PROCEDURE — 3008F PR BODY MASS INDEX (BMI) DOCUMENTED: ICD-10-PCS | Mod: CPTII,S$GLB,, | Performed by: PHYSICIAN ASSISTANT

## 2022-06-03 PROCEDURE — 1159F MED LIST DOCD IN RCRD: CPT | Mod: CPTII,S$GLB,, | Performed by: PHYSICIAN ASSISTANT

## 2022-06-03 PROCEDURE — 99999 PR PBB SHADOW E&M-EST. PATIENT-LVL III: CPT | Mod: PBBFAC,,, | Performed by: PHYSICIAN ASSISTANT

## 2022-06-03 PROCEDURE — 99284 EMERGENCY DEPT VISIT MOD MDM: CPT | Mod: 25

## 2022-06-03 PROCEDURE — 93010 ELECTROCARDIOGRAM REPORT: CPT | Mod: ,,, | Performed by: INTERNAL MEDICINE

## 2022-06-03 PROCEDURE — 93005 ELECTROCARDIOGRAM TRACING: CPT

## 2022-06-03 PROCEDURE — 99214 PR OFFICE/OUTPT VISIT, EST, LEVL IV, 30-39 MIN: ICD-10-PCS | Mod: S$GLB,,, | Performed by: PHYSICIAN ASSISTANT

## 2022-06-03 PROCEDURE — 99214 OFFICE O/P EST MOD 30 MIN: CPT | Mod: S$GLB,,, | Performed by: PHYSICIAN ASSISTANT

## 2022-06-03 RX ORDER — MOMETASONE FUROATE 50 UG/1
2 SPRAY, METERED NASAL DAILY
Qty: 17 G | Refills: 0 | Status: SHIPPED | OUTPATIENT
Start: 2022-06-03

## 2022-06-03 RX ORDER — FLUTICASONE PROPIONATE 50 MCG
1 SPRAY, SUSPENSION (ML) NASAL 2 TIMES DAILY
Qty: 16 G | Refills: 2 | Status: SHIPPED | OUTPATIENT
Start: 2022-06-03 | End: 2022-06-03

## 2022-06-03 RX ORDER — CETIRIZINE HYDROCHLORIDE 10 MG/1
10 TABLET ORAL DAILY
Qty: 30 TABLET | Refills: 2 | Status: SHIPPED | OUTPATIENT
Start: 2022-06-03 | End: 2023-06-03

## 2022-06-03 NOTE — PATIENT INSTRUCTIONS
Disp Refills Start End CHERELLE   cetirizine (ZYRTEC) 10 MG tablet 30 tablet 2 6/3/2022 6/3/2023 --   Sig - Route: Take 1 tablet (10 mg total) by mouth once daily. - Oral   Sent to pharmacy as: cetirizine (ZYRTEC) 10 MG tablet     mometasone (NASONEX) 50 mcg/actuation nasal spray 17 g 0 6/3/2022  --   Sig - Route: 2 sprays by Nasal route once daily. - Nasal     Do the above therapy for 8 weeks and then as needed for nasal congestion.    Follow up with neurology for headache evaluation.

## 2022-06-04 NOTE — ED NOTES
Patient is alert & oriented, vital signs stable, c/o headache that subsided with meds, CT scan & 12 lead EKG done, reviewed d/c orders, verbalized understanding, d/c home stable.

## 2022-06-04 NOTE — ED PROVIDER NOTES
Encounter Date: 6/3/2022       History     Chief Complaint   Patient presents with    Headache     And chest discomfort/L arm for the past few weeks. Under a lot of stress. Was ENT today and everything was clear there     32-year-old healthy male presents with 1 month of right-sided headache.  This is above the right eye.  It does not affect the eye.  Off and on for 1 month.  He reports stressors at home.  Saw ENT today and was referred to Neurology.  He reports left-sided axillary and shoulder tingling as well off and on for 1 month.  Occasional chest tightness.  No shortness of breath no extremity numbness or weakness.  No neck pain no visual changes.    The history is provided by the patient.     Review of patient's allergies indicates:   Allergen Reactions    Augmentin [amoxicillin-pot clavulanate] Palpitations and Other (See Comments)     Patient states that he feels like is having a heart attack      Past Medical History:   Diagnosis Date    Chronic prostatitis 10/16/2012    Elevated transaminase measurement 9/14/2012    Herniated disc     Male pelvic pain 5/2/2013    Pelvic muscle wasting 5/9/2013    Prostatitis      Past Surgical History:   Procedure Laterality Date    ESOPHAGOGASTRODUODENOSCOPY N/A 5/21/2020    Procedure: EGD (ESOPHAGOGASTRODUODENOSCOPY);  Surgeon: Carmela Messina MD;  [facility];  Service: Endoscopy;  Laterality: N/A;    TONSILLECTOMY       Family History   Problem Relation Age of Onset    Hypertension Father     Glaucoma Mother     Hypertension Maternal Aunt     Hypertension Maternal Uncle     Heart disease Maternal Uncle     Amblyopia Neg Hx     Blindness Neg Hx     Cataracts Neg Hx     Macular degeneration Neg Hx     Retinal detachment Neg Hx     Strabismus Neg Hx     Heart attack Neg Hx     Heart failure Neg Hx      Social History     Tobacco Use    Smoking status: Never Smoker    Smokeless tobacco: Never Used   Substance Use Topics    Alcohol  use: Not Currently    Drug use: No     Review of Systems   Constitutional: Negative for fever.   Respiratory: Negative for shortness of breath.    Cardiovascular: Negative for chest pain.   Gastrointestinal: Negative for nausea.   Genitourinary: Negative.  Negative for dysuria.   Musculoskeletal: Negative for back pain.   Skin: Negative for rash.   Neurological: Positive for headaches. Negative for tremors and weakness.        Tingling left axilla       Physical Exam     Initial Vitals [06/03/22 2045]   BP Pulse Resp Temp SpO2   135/77 85 16 97.8 °F (36.6 °C) 99 %      MAP       --         Physical Exam    Nursing note and vitals reviewed.  Constitutional: He appears well-developed and well-nourished. He is not diaphoretic.  Non-toxic appearance. He does not have a sickly appearance. He does not appear ill. No distress.   HENT:   Head: Normocephalic and atraumatic.   Eyes: EOM are normal.   Neck: Neck supple.   Normal range of motion.  Cardiovascular: Normal rate, regular rhythm and normal heart sounds. Exam reveals no gallop and no friction rub.    No murmur heard.  Pulmonary/Chest: Breath sounds normal. No respiratory distress. He has no wheezes. He has no rhonchi. He has no rales.   Musculoskeletal:         General: Normal range of motion.      Cervical back: Normal range of motion and neck supple. No rigidity. Normal range of motion.     Neurological: He is alert and oriented to person, place, and time. He has normal strength. No cranial nerve deficit or sensory deficit.   Skin: Skin is warm and dry. No rash noted.   Psychiatric: He has a normal mood and affect. His behavior is normal. Judgment and thought content normal.         ED Course   Procedures  Labs Reviewed - No data to display       Imaging Results    None          Medications - No data to display              ED Course as of 06/03/22 2255 Fri Jun 03, 2022 2138 BP: 135/77 [EF]   2138 Temp: 97.8 °F (36.6 °C) [EF]   2138 Temp src: Oral [EF]   2138  Pulse: 85 [EF]   2138 Resp: 16 [EF]   2138 SpO2: 99 % [EF]   2209 EKG sinus rhythm 75 beats per minute normal axis no ST elevation or depression no T-wave inversion independently interpreted [EF]   2227 CT Head Without Contrast [EF]      ED Course User Index  [EF] Osito Gaston MD             Clinical Impression:   Final diagnoses:  [R07.89] Chest wall pain  [R07.89] Chest tightness                   32-year-old presents with right-sided headache off and on for 1 month.  Also some left-sided axillary tingling.  CT of the head is negative.  EKG is normal.  He is referred to Neurology.  Very well-appearing in the ER.  I do not think this represents any serious etiology at this time.  No neck pain or stiffness.  No signs of infection on exam.      Osito Gaston MD  06/03/22 [id number]

## 2022-06-06 ENCOUNTER — TELEPHONE (OUTPATIENT)
Dept: OTOLARYNGOLOGY | Facility: CLINIC | Age: 32
End: 2022-06-06
Payer: COMMERCIAL

## 2022-06-06 NOTE — TELEPHONE ENCOUNTER
Recent office/provider note and referral was faxed to [facility] [address] - Dr. Nando Perdomo.     ----- Message from James Romeo PA-C sent at 6/5/2022  4:42 PM CDT -----  Note completed. Please fax to Dr. Nando Coker for evaluation of headache.

## 2022-06-20 DIAGNOSIS — G44.229 CHRONIC TENSION-TYPE HEADACHE, NOT INTRACTABLE: Primary | ICD-10-CM

## 2022-07-13 ENCOUNTER — HOSPITAL ENCOUNTER (OUTPATIENT)
Dept: RADIOLOGY | Facility: HOSPITAL | Age: 32
Discharge: HOME | End: 2022-07-13
Attending: NURSE PRACTITIONER
Payer: COMMERCIAL

## 2022-07-13 DIAGNOSIS — G44.229 CHRONIC TENSION-TYPE HEADACHE, NOT INTRACTABLE: ICD-10-CM

## 2022-07-13 PROCEDURE — 70551 MRI BRAIN STEM W/O DYE: CPT | Mod: TC,PO

## 2022-09-05 ENCOUNTER — OFFICE VISIT (OUTPATIENT)
Dept: URGENT CARE | Facility: CLINIC | Age: 32
End: 2022-09-05
Payer: COMMERCIAL

## 2022-09-05 VITALS
RESPIRATION RATE: 18 BRPM | DIASTOLIC BLOOD PRESSURE: 67 MMHG | HEART RATE: 69 BPM | WEIGHT: 180 LBS | HEIGHT: 67 IN | BODY MASS INDEX: 28.25 KG/M2 | OXYGEN SATURATION: 96 % | TEMPERATURE: 98 F | SYSTOLIC BLOOD PRESSURE: 104 MMHG

## 2022-09-05 DIAGNOSIS — J01.40 ACUTE PANSINUSITIS, RECURRENCE NOT SPECIFIED: Primary | ICD-10-CM

## 2022-09-05 DIAGNOSIS — H92.01 OTALGIA, RIGHT: ICD-10-CM

## 2022-09-05 LAB
CTP QC/QA: YES
SARS-COV-2 RDRP RESP QL NAA+PROBE: NEGATIVE

## 2022-09-05 PROCEDURE — 1160F PR REVIEW ALL MEDS BY PRESCRIBER/CLIN PHARMACIST DOCUMENTED: ICD-10-PCS | Mod: CPTII,S$GLB,, | Performed by: PHYSICIAN ASSISTANT

## 2022-09-05 PROCEDURE — 3008F BODY MASS INDEX DOCD: CPT | Mod: CPTII,S$GLB,, | Performed by: PHYSICIAN ASSISTANT

## 2022-09-05 PROCEDURE — 3074F SYST BP LT 130 MM HG: CPT | Mod: CPTII,S$GLB,, | Performed by: PHYSICIAN ASSISTANT

## 2022-09-05 PROCEDURE — 1159F PR MEDICATION LIST DOCUMENTED IN MEDICAL RECORD: ICD-10-PCS | Mod: CPTII,S$GLB,, | Performed by: PHYSICIAN ASSISTANT

## 2022-09-05 PROCEDURE — 3078F PR MOST RECENT DIASTOLIC BLOOD PRESSURE < 80 MM HG: ICD-10-PCS | Mod: CPTII,S$GLB,, | Performed by: PHYSICIAN ASSISTANT

## 2022-09-05 PROCEDURE — 3078F DIAST BP <80 MM HG: CPT | Mod: CPTII,S$GLB,, | Performed by: PHYSICIAN ASSISTANT

## 2022-09-05 PROCEDURE — 3074F PR MOST RECENT SYSTOLIC BLOOD PRESSURE < 130 MM HG: ICD-10-PCS | Mod: CPTII,S$GLB,, | Performed by: PHYSICIAN ASSISTANT

## 2022-09-05 PROCEDURE — 3008F PR BODY MASS INDEX (BMI) DOCUMENTED: ICD-10-PCS | Mod: CPTII,S$GLB,, | Performed by: PHYSICIAN ASSISTANT

## 2022-09-05 PROCEDURE — 99213 OFFICE O/P EST LOW 20 MIN: CPT | Mod: S$GLB,,, | Performed by: PHYSICIAN ASSISTANT

## 2022-09-05 PROCEDURE — U0002: ICD-10-PCS | Mod: QW,S$GLB,, | Performed by: PHYSICIAN ASSISTANT

## 2022-09-05 PROCEDURE — U0002 COVID-19 LAB TEST NON-CDC: HCPCS | Mod: QW,S$GLB,, | Performed by: PHYSICIAN ASSISTANT

## 2022-09-05 PROCEDURE — 1159F MED LIST DOCD IN RCRD: CPT | Mod: CPTII,S$GLB,, | Performed by: PHYSICIAN ASSISTANT

## 2022-09-05 PROCEDURE — 99213 PR OFFICE/OUTPT VISIT, EST, LEVL III, 20-29 MIN: ICD-10-PCS | Mod: S$GLB,,, | Performed by: PHYSICIAN ASSISTANT

## 2022-09-05 PROCEDURE — 1160F RVW MEDS BY RX/DR IN RCRD: CPT | Mod: CPTII,S$GLB,, | Performed by: PHYSICIAN ASSISTANT

## 2022-09-05 NOTE — PROGRESS NOTES
"Subjective:       Patient ID: Valdemar Allen is a 32 y.o. male.    Vitals:  height is 5' 7" (1.702 m) and weight is 81.6 kg (180 lb). His oral temperature is 98.4 °F (36.9 °C). His blood pressure is 104/67 and his pulse is 69. His respiration is 18 and oxygen saturation is 96%.     Chief Complaint: Sinus Problem (Face congestion, right side of neck pain , right earache)    This is a 32 y.o. male who presents today with a chief complaint of sinus symptoms, that started four days ago. Pt complaints of sinus congestion, sinus pressure/pain,  right ear pain. No sore throat or fever. No cough. No known sick contacts. Hearing normal. Pt explain that he sued OTC pain and allergy med's for treatment but no relief, and he would liked to be tested for Covid in today visited.        Sinus Problem  This is a new problem. The current episode started in the past 7 days. The problem is unchanged. There has been no fever. His pain is at a severity of 1/10. He is experiencing no pain. Associated symptoms include congestion, ear pain, headaches, neck pain and sinus pressure. Pertinent negatives include no chills, coughing, diaphoresis, hoarse voice, sneezing or sore throat. Past treatments include acetaminophen. The treatment provided no relief.     Constitution: Negative for chills, sweating, fatigue and fever.   HENT:  Positive for ear pain, congestion, sinus pain and sinus pressure. Negative for sore throat.    Neck: Positive for neck pain. Negative for neck stiffness.   Cardiovascular:  Negative for chest pain, leg swelling, palpitations and sob on exertion.   Eyes:  Negative for eye itching, eye pain and eye redness.   Respiratory:  Negative for cough.    Gastrointestinal:  Negative for abdominal pain, nausea, vomiting and diarrhea.   Musculoskeletal:  Negative for trauma, joint swelling and abnormal ROM of joint.   Skin:  Negative for color change and rash.   Allergic/Immunologic: Negative for sneezing.   Neurological:  Positive " for headaches. Negative for dizziness, light-headedness, speech difficulty, history of migraines, numbness and tingling.     Objective:      Physical Exam   Constitutional: He is oriented to person, place, and time. He appears well-developed. He is cooperative.  Non-toxic appearance. He does not appear ill. No distress.   HENT:   Head: Normocephalic and atraumatic.   Ears:   Right Ear: Hearing, tympanic membrane, external ear and ear canal normal.   Left Ear: Hearing, tympanic membrane, external ear and ear canal normal.   Nose: Mucosal edema present. No rhinorrhea or nasal deformity. No epistaxis. Right sinus exhibits maxillary sinus tenderness and frontal sinus tenderness. Left sinus exhibits maxillary sinus tenderness. Left sinus exhibits no frontal sinus tenderness.   Mouth/Throat: Uvula is midline, oropharynx is clear and moist and mucous membranes are normal. He does not have dentures. No trismus in the jaw. Normal dentition. No uvula swelling or dental caries. No oropharyngeal exudate, posterior oropharyngeal edema or posterior oropharyngeal erythema. Tonsils are 1+ on the right. Tonsils are 1+ on the left. No tonsillar exudate.   No abnormality to right ear or periauricular abnormality. No TMJ ttp or crepitus. No temporal ttp. No swelling of face or neck. Full ROM neck and jaw. Sinus ttp right frontal and bilat maxillary.       Comments: No abnormality to right ear or periauricular abnormality. No TMJ ttp or crepitus. No temporal ttp. No swelling of face or neck. Full ROM neck and jaw. Sinus ttp right frontal and bilat maxillary.   Eyes: Conjunctivae and lids are normal. Right eye exhibits no discharge. Left eye exhibits no discharge. No scleral icterus.   Neck: Trachea normal and phonation normal. Neck supple. No edema present. No erythema present. No neck rigidity present.   Cardiovascular: Normal rate, regular rhythm, normal heart sounds and normal pulses.   Pulmonary/Chest: Effort normal and breath  sounds normal. No accessory muscle usage or stridor. No tachypnea and no bradypnea. No respiratory distress. He has no decreased breath sounds. He has no wheezes. He has no rhonchi. He has no rales.   Abdominal: Normal appearance.   Musculoskeletal: Normal range of motion.         General: No deformity. Normal range of motion.   Lymphadenopathy:        Head (right side): No submandibular, no preauricular and no posterior auricular adenopathy present.        Head (left side): No submandibular, no preauricular and no posterior auricular adenopathy present.     He has no cervical adenopathy.   Neurological: He is alert and oriented to person, place, and time. He exhibits normal muscle tone. Coordination normal.   Skin: Skin is warm, dry, intact, not diaphoretic and not pale.   Psychiatric: His speech is normal and behavior is normal. Judgment and thought content normal.   Nursing note and vitals reviewed.        Results for orders placed or performed in visit on 09/05/22   POCT COVID-19 Rapid Screening   Result Value Ref Range    POC Rapid COVID Negative Negative     Acceptable Yes        Assessment:       1. Acute pansinusitis, recurrence not specified    2. Otalgia, right          Plan:       Suspect allergic sinusitis. Discussed otc options. No red flag s/s. No otitis. Pt declined rx medication. - Discussed ddx, home care, tx options, and given follow up precautions.     Acute pansinusitis, recurrence not specified  -     POCT COVID-19 Rapid Screening    Otalgia, right    Patient Instructions   - Rest.    - Drink plenty of fluids.  - Viral upper respiratory infections typically run their course in 10-14 days.     - Tylenol or Ibuprofen as directed as needed for fever/pain. Avoid tylenol if you have a history of liver disease. Do not take ibuprofen if you have a history of GI bleeding, kidney disease, or if you take blood thinners.     - You can take over-the-counter claritin, zyrtec, allegra, or xyzal  as directed. These are antihistamines that can help with runny nose, nasal congestion, sneezing, and helps to dry up post-nasal drip, which usually causes sore throat and cough.   - If you do NOT have high blood pressure, you may use a decongestant form (D)  of this medication (ie. Claritin- D, zyrtec-D, allegra-D) or if you do not take the D form, you can take sudafed (pseudoephedrine) over the counter, which is a decongestant. Do NOT take two decongestant (D) medications at the same time (such as mucinex-D and claritin-D or plain sudafed and claritin D)    - You can use Flonase (fluticasone) nasal spray as directed for sinus congestion and postnasal drip. This is a steroid nasal spray that works locally over time to decrease the inflammation in your nose/sinuses and help with allergic symptoms. This is not an quick- relief spray like afrin, but it works well if used daily.  Discontinue if you develop nose bleed  - use nasal saline prior to Flonase.  - Use Ocean Spray Nasal Saline 1-3 puffs each nostril every 2-3 hours then blow out onto tissue. This is to irrigate the nasal passage way to clear the sinus openings. Use until sinus problem resolved.      - Follow up with your PCP or specialty clinic as directed in the next 1-2 weeks if not improved or as needed.  You can call [phone] to schedule an appointment with the appropriate provider.      - Go to the ER if you develop new or worsening symptoms.     - You must understand that you have received an Urgent Care treatment only and that you may be released before all of your medical problems are known or treated.   - You, the patient, will arrange for follow up care as instructed.   - If your condition worsens or fails to improve we recommend that you receive another evaluation at the ER immediately or contact your PCP to discuss your concerns or return here.

## 2022-09-05 NOTE — PATIENT INSTRUCTIONS
- Rest.    - Drink plenty of fluids.  - Viral upper respiratory infections typically run their course in 10-14 days.     - Tylenol or Ibuprofen as directed as needed for fever/pain. Avoid tylenol if you have a history of liver disease. Do not take ibuprofen if you have a history of GI bleeding, kidney disease, or if you take blood thinners.     - You can take over-the-counter claritin, zyrtec, allegra, or xyzal as directed. These are antihistamines that can help with runny nose, nasal congestion, sneezing, and helps to dry up post-nasal drip, which usually causes sore throat and cough.   - If you do NOT have high blood pressure, you may use a decongestant form (D)  of this medication (ie. Claritin- D, zyrtec-D, allegra-D) or if you do not take the D form, you can take sudafed (pseudoephedrine) over the counter, which is a decongestant. Do NOT take two decongestant (D) medications at the same time (such as mucinex-D and claritin-D or plain sudafed and claritin D)    - You can use Flonase (fluticasone) nasal spray as directed for sinus congestion and postnasal drip. This is a steroid nasal spray that works locally over time to decrease the inflammation in your nose/sinuses and help with allergic symptoms. This is not an quick- relief spray like afrin, but it works well if used daily.  Discontinue if you develop nose bleed  - use nasal saline prior to Flonase.  - Use Ocean Spray Nasal Saline 1-3 puffs each nostril every 2-3 hours then blow out onto tissue. This is to irrigate the nasal passage way to clear the sinus openings. Use until sinus problem resolved.      - Follow up with your PCP or specialty clinic as directed in the next 1-2 weeks if not improved or as needed.  You can call [phone] to schedule an appointment with the appropriate provider.      - Go to the ER if you develop new or worsening symptoms.     - You must understand that you have received an Urgent Care treatment only and that you may be  released before all of your medical problems are known or treated.   - You, the patient, will arrange for follow up care as instructed.   - If your condition worsens or fails to improve we recommend that you receive another evaluation at the ER immediately or contact your PCP to discuss your concerns or return here.

## 2023-04-30 ENCOUNTER — HOSPITAL ENCOUNTER (EMERGENCY)
Facility: HOSPITAL | Age: 33
Discharge: HOME | End: 2023-04-30
Attending: EMERGENCY MEDICINE
Payer: COMMERCIAL

## 2023-04-30 VITALS
TEMPERATURE: 98 F | HEART RATE: 60 BPM | BODY MASS INDEX: 25.84 KG/M2 | SYSTOLIC BLOOD PRESSURE: 102 MMHG | RESPIRATION RATE: 17 BRPM | DIASTOLIC BLOOD PRESSURE: 64 MMHG | WEIGHT: 165 LBS | OXYGEN SATURATION: 98 %

## 2023-04-30 DIAGNOSIS — R51.9 NONINTRACTABLE EPISODIC HEADACHE, UNSPECIFIED HEADACHE TYPE: Primary | ICD-10-CM

## 2023-04-30 LAB
ALBUMIN SERPL BCP-MCNC: 4.3 G/DL (ref 3.5–5.2)
ALP SERPL-CCNC: 56 U/L (ref 55–135)
ALT SERPL W/O P-5'-P-CCNC: 29 U/L (ref 10–44)
ANION GAP SERPL CALC-SCNC: 9 MMOL/L (ref 8–16)
AST SERPL-CCNC: 21 U/L (ref 10–40)
BASOPHILS # BLD AUTO: 0.06 K/UL (ref 0–0.2)
BASOPHILS NFR BLD: 0.7 % (ref 0–1.9)
BILIRUB SERPL-MCNC: 0.5 MG/DL (ref 0.1–1)
BUN SERPL-MCNC: 15 MG/DL (ref 6–20)
CALCIUM SERPL-MCNC: 9.4 MG/DL (ref 8.7–10.5)
CHLORIDE SERPL-SCNC: 102 MMOL/L (ref 95–110)
CO2 SERPL-SCNC: 28 MMOL/L (ref 23–29)
CREAT SERPL-MCNC: 1.1 MG/DL (ref 0.5–1.4)
DIFFERENTIAL METHOD: ABNORMAL
EOSINOPHIL # BLD AUTO: 0.2 K/UL (ref 0–0.5)
EOSINOPHIL NFR BLD: 2.2 % (ref 0–8)
ERYTHROCYTE [DISTWIDTH] IN BLOOD BY AUTOMATED COUNT: 12.9 % (ref 11.5–14.5)
EST. GFR  (NO RACE VARIABLE): >60 ML/MIN/1.73 M^2
GLUCOSE SERPL-MCNC: 99 MG/DL (ref 70–110)
HCT VFR BLD AUTO: 42.2 % (ref 40–54)
HGB BLD-MCNC: 13.9 G/DL (ref 14–18)
IMM GRANULOCYTES # BLD AUTO: 0.03 K/UL (ref 0–0.04)
IMM GRANULOCYTES NFR BLD AUTO: 0.3 % (ref 0–0.5)
LYMPHOCYTES # BLD AUTO: 4 K/UL (ref 1–4.8)
LYMPHOCYTES NFR BLD: 44.2 % (ref 18–48)
MCH RBC QN AUTO: 27.4 PG (ref 27–31)
MCHC RBC AUTO-ENTMCNC: 32.9 G/DL (ref 32–36)
MCV RBC AUTO: 83 FL (ref 82–98)
MONOCYTES # BLD AUTO: 0.7 K/UL (ref 0.3–1)
MONOCYTES NFR BLD: 7.5 % (ref 4–15)
NEUTROPHILS # BLD AUTO: 4.1 K/UL (ref 1.8–7.7)
NEUTROPHILS NFR BLD: 45.1 % (ref 38–73)
NRBC BLD-RTO: 0 /100 WBC
PLATELET # BLD AUTO: 218 K/UL (ref 150–450)
PMV BLD AUTO: 10.3 FL (ref 9.2–12.9)
POTASSIUM SERPL-SCNC: 3.8 MMOL/L (ref 3.5–5.1)
PROT SERPL-MCNC: 7.6 G/DL (ref 6–8.4)
RBC # BLD AUTO: 5.07 M/UL (ref 4.6–6.2)
SODIUM SERPL-SCNC: 139 MMOL/L (ref 136–145)
WBC # BLD AUTO: 9.1 K/UL (ref 3.9–12.7)

## 2023-04-30 PROCEDURE — 85025 COMPLETE CBC W/AUTO DIFF WBC: CPT | Performed by: EMERGENCY MEDICINE

## 2023-04-30 PROCEDURE — 80053 COMPREHEN METABOLIC PANEL: CPT | Performed by: EMERGENCY MEDICINE

## 2023-04-30 PROCEDURE — 99285 EMERGENCY DEPT VISIT HI MDM: CPT | Mod: 25

## 2023-04-30 PROCEDURE — 25500020 PHARM REV CODE 255: Performed by: EMERGENCY MEDICINE

## 2023-04-30 RX ADMIN — IOHEXOL 100 ML: 350 INJECTION, SOLUTION INTRAVENOUS at 03:04

## 2023-04-30 NOTE — ED PROVIDER NOTES
"Encounter Date: 4/30/2023       History     Chief Complaint   Patient presents with    Headache     Sharp stabbing headache "all over" after sex.      Chief complaint is severe headache that started instantaneously after orgasm shortly prior to arrival.  It is 1 of the worst headaches he had.  Now the headache is subsiding but is on his hyper the head.  He did not lose consciousness.  No ambulatory problems no trouble with his speech. Sharp stabbing , is how the patient describes the headache on the top of his head.  Now he has a more of a dull headache which a highs expanded to the frontal area of his skull.  He does have some sinus issues that he noticed in the last week as well.      Review of patient's allergies indicates:   Allergen Reactions    Augmentin [amoxicillin-pot clavulanate] Palpitations and Other (See Comments)     Patient states that he feels like is having a heart attack      Past Medical History:   Diagnosis Date    Chronic prostatitis 10/16/2012    Elevated transaminase measurement 9/14/2012    Herniated disc     Male pelvic pain 5/2/2013    Pelvic muscle wasting 5/9/2013    Prostatitis      Past Surgical History:   Procedure Laterality Date    ESOPHAGOGASTRODUODENOSCOPY N/A 5/21/2020    Procedure: EGD (ESOPHAGOGASTRODUODENOSCOPY);  Surgeon: Carmela Messina MD;  Location: [facility];  Service: Endoscopy;  Laterality: N/A;    TONSILLECTOMY       Family History   Problem Relation Age of Onset    Hypertension Father     Glaucoma Mother     Hypertension Maternal Aunt     Hypertension Maternal Uncle     Heart disease Maternal Uncle     Amblyopia Neg Hx     Blindness Neg Hx     Cataracts Neg Hx     Macular degeneration Neg Hx     Retinal detachment Neg Hx     Strabismus Neg Hx     Heart attack Neg Hx     Heart failure Neg Hx      Social History     Tobacco Use    Smoking status: Never    Smokeless tobacco: Never   Substance Use Topics    Alcohol use: Not Currently    Drug use: No     Review of " Systems   Constitutional:  Negative for chills and fever.   HENT:  Negative for ear pain, rhinorrhea and sore throat.    Eyes:  Negative for pain and visual disturbance.   Respiratory:  Negative for cough and shortness of breath.    Cardiovascular:  Negative for chest pain and palpitations.   Gastrointestinal:  Negative for abdominal pain, constipation, diarrhea, nausea and vomiting.   Genitourinary:  Negative for dysuria, frequency, hematuria and urgency.   Musculoskeletal:  Negative for back pain, joint swelling and myalgias.   Skin:  Negative for rash.   Neurological:  Positive for headaches. Negative for dizziness, seizures and weakness.   Psychiatric/Behavioral:  Negative for dysphoric mood. The patient is not nervous/anxious.      Physical Exam     Initial Vitals [04/30/23 0200]   BP Pulse Resp Temp SpO2   120/89 80 17 98.3 °F (36.8 °C) 100 %      MAP       --         Physical Exam    Nursing note and vitals reviewed.  Constitutional: He appears well-developed and well-nourished.   HENT:   Head: Normocephalic and atraumatic.   Eyes: Conjunctivae, EOM and lids are normal. Pupils are equal, round, and reactive to light.   Neck: Trachea normal. Neck supple. No thyroid mass present.   Cardiovascular:  Normal rate, regular rhythm and normal heart sounds.           Pulmonary/Chest: Breath sounds normal. No respiratory distress.   Abdominal: Abdomen is soft. There is no abdominal tenderness.   Musculoskeletal:         General: Normal range of motion.      Cervical back: Neck supple.     Neurological: He is alert and oriented to person, place, and time. He has normal strength and normal reflexes. No cranial nerve deficit or sensory deficit.   Skin: Skin is warm and dry.   Psychiatric: He has a normal mood and affect. His speech is normal and behavior is normal. Judgment and thought content normal.       ED Course   Procedures  Labs Reviewed   CBC W/ AUTO DIFFERENTIAL - Abnormal; Notable for the following components:        Result Value    Hemoglobin 13.9 (*)     All other components within normal limits   COMPREHENSIVE METABOLIC PANEL          Imaging Results              CTA Head and Neck (xpd) (Final result)  Result time 04/30/23 03:56:26      Final result by Calvin Larios MD (04/30/23 03:56:26)                   Narrative:    EXAM DESCRIPTION:  CTA HEAD AND NECK (XPD) (accession [id number]), CT HEAD WITHOUT CONTRAST (accession [id number])    CLINICAL HISTORY:  33 years  Male  headache    TECHNIQUE:  Axial noncontrast CT head with coronal and sagittal reformats. Following intravenous injection of contrast, high-resolution multiple axial helical CT images were obtained through the head and neck with multiplanar reformation, 3D and MIP reconstructions. Stenosis measurements performed using NASCET criteria. All CT scans at this facility use dose modulation, iterative reconstruction, and/or weight based dosing when appropriate to reduce radiation dose to as low as reasonably achievable.    COMPARISONS: CT head 6/3/2022 and MRI brain 7/17/2022    FINDINGS:    CT HEAD:  Brain: No intracranial hemorrhage, midline shift, mass or mass effect. No obvious large acute territorial infarction.  Ventricles: No hydrocephalus.    Orbits: Unremarkable.  Sinuses: Visualized portions are clear.  Mastoid: Clear.  Osseous: Unremarkable.  Soft tissues: Unremarkable.    CTA HEAD:    LEFT:  Internal carotid artery: Unremarkable.  Anterior cerebral arteries:   Unremarkable.  Middle cerebral arteries:   Unremarkable.  Posterior cerebral arteries:  Unremarkable.    RIGHT:  Internal carotid artery: Unremarkable.  Anterior cerebral arteries:   Unremarkable.  Middle cerebral arteries:   Unremarkable.  Posterior cerebral arteries:  Unremarkable.    Basilar artery:  Unremarkable.    Vertebral artery: Unremarkable.    Other: No aneurysm.    CTA NECK:    Aortic arch:  Unremarkable.  Brachiocephalic artery:  Unremarkable.    LEFT  Subclavian artery:   Unremarkable.  Vertebral artery:   Unremarkable.  Common carotid artery:   Unremarkable.  Internal carotid:   Unremarkable.    RIGHT  Subclavian artery:  Unremarkable.  Vertebral artery:   Unremarkable.  Common carotid artery:   Unremarkable.  Internal carotid:   Unremarkable.    IMPRESSION:  1. No acute intracranial abnormality.  2. Unremarkable CTA of the head and neck.        Electronically signed by:  Calvin Larios MD  4/30/2023 3:56 AM CDT Workstation: [id number]                                     CT Head Without Contrast (Final result)  Result time 04/30/23 03:56:26      Final result by Calvin Larios MD (04/30/23 03:56:26)                   Narrative:    EXAM DESCRIPTION:  CTA HEAD AND NECK (XPD) (accession [id number]), CT HEAD WITHOUT CONTRAST (accession [id number])    CLINICAL HISTORY:  33 years  Male  headache    TECHNIQUE:  Axial noncontrast CT head with coronal and sagittal reformats. Following intravenous injection of contrast, high-resolution multiple axial helical CT images were obtained through the head and neck with multiplanar reformation, 3D and MIP reconstructions. Stenosis measurements performed using NASCET criteria. All CT scans at this facility use dose modulation, iterative reconstruction, and/or weight based dosing when appropriate to reduce radiation dose to as low as reasonably achievable.    COMPARISONS: CT head 6/3/2022 and MRI brain 7/17/2022    FINDINGS:    CT HEAD:  Brain: No intracranial hemorrhage, midline shift, mass or mass effect. No obvious large acute territorial infarction.  Ventricles: No hydrocephalus.    Orbits: Unremarkable.  Sinuses: Visualized portions are clear.  Mastoid: Clear.  Osseous: Unremarkable.  Soft tissues: Unremarkable.    CTA HEAD:    LEFT:  Internal carotid artery: Unremarkable.  Anterior cerebral arteries:   Unremarkable.  Middle cerebral arteries:   Unremarkable.  Posterior cerebral arteries:  Unremarkable.    RIGHT:  Internal carotid artery:  Unremarkable.  Anterior cerebral arteries:   Unremarkable.  Middle cerebral arteries:   Unremarkable.  Posterior cerebral arteries:  Unremarkable.    Basilar artery:  Unremarkable.    Vertebral artery: Unremarkable.    Other: No aneurysm.    CTA NECK:    Aortic arch:  Unremarkable.  Brachiocephalic artery:  Unremarkable.    LEFT  Subclavian artery:  Unremarkable.  Vertebral artery:   Unremarkable.  Common carotid artery:   Unremarkable.  Internal carotid:   Unremarkable.    RIGHT  Subclavian artery:  Unremarkable.  Vertebral artery:   Unremarkable.  Common carotid artery:   Unremarkable.  Internal carotid:   Unremarkable.    IMPRESSION:  1. No acute intracranial abnormality.  2. Unremarkable CTA of the head and neck.        Electronically signed by:  Calvin Larios MD  4/30/2023 3:56 AM CDT Workstation: [id number]                                     Medications   iohexoL (OMNIPAQUE 350) injection 100 mL (100 mLs Intravenous Given 4/30/23 0311)     Medical Decision Making:   ED Management:  Postcoital headache differential diagnosis includes migraine headache tension headache subarachnoid hemorrhage bleed with headache headache of infectious origin among others.  In this case head CT negative CTA head negative.  Headache resolved patient will be discharged home with instructions                        Clinical Impression:   Final diagnoses:  [R51.9] Nonintractable episodic headache, unspecified headache type (Primary)        ED Disposition Condition    Discharge Stable          ED Prescriptions    None       Follow-up Information       Follow up With Specialties Details Why Contact Info    Kyler Caballero MD Family Medicine, Sports Medicine Schedule an appointment as soon as possible for a visit in 3 days  [address]  [place] [address]  [phone]               Lucy Stevens MD  04/30/23 [id number]

## 2023-06-22 ENCOUNTER — OFFICE VISIT (OUTPATIENT)
Dept: OTOLARYNGOLOGY | Facility: CLINIC | Age: 33
End: 2023-06-22
Payer: COMMERCIAL

## 2023-06-22 VITALS
WEIGHT: 164.88 LBS | SYSTOLIC BLOOD PRESSURE: 116 MMHG | TEMPERATURE: 98 F | RESPIRATION RATE: 16 BRPM | HEIGHT: 67 IN | HEART RATE: 73 BPM | BODY MASS INDEX: 25.88 KG/M2 | DIASTOLIC BLOOD PRESSURE: 76 MMHG

## 2023-06-22 DIAGNOSIS — J34.89 DRY NOSE: Primary | ICD-10-CM

## 2023-06-22 DIAGNOSIS — M26.629 TMJPDS (TEMPOROMANDIBULAR JOINT PAIN DYSFUNCTION SYNDROME): ICD-10-CM

## 2023-06-22 PROCEDURE — 3008F PR BODY MASS INDEX (BMI) DOCUMENTED: ICD-10-PCS | Mod: CPTII,S$GLB,, | Performed by: PHYSICIAN ASSISTANT

## 2023-06-22 PROCEDURE — 1159F MED LIST DOCD IN RCRD: CPT | Mod: CPTII,S$GLB,, | Performed by: PHYSICIAN ASSISTANT

## 2023-06-22 PROCEDURE — 1160F RVW MEDS BY RX/DR IN RCRD: CPT | Mod: CPTII,S$GLB,, | Performed by: PHYSICIAN ASSISTANT

## 2023-06-22 PROCEDURE — 3078F DIAST BP <80 MM HG: CPT | Mod: CPTII,S$GLB,, | Performed by: PHYSICIAN ASSISTANT

## 2023-06-22 PROCEDURE — 1159F PR MEDICATION LIST DOCUMENTED IN MEDICAL RECORD: ICD-10-PCS | Mod: CPTII,S$GLB,, | Performed by: PHYSICIAN ASSISTANT

## 2023-06-22 PROCEDURE — 99999 PR PBB SHADOW E&M-EST. PATIENT-LVL V: ICD-10-PCS | Mod: PBBFAC,,, | Performed by: PHYSICIAN ASSISTANT

## 2023-06-22 PROCEDURE — 3078F PR MOST RECENT DIASTOLIC BLOOD PRESSURE < 80 MM HG: ICD-10-PCS | Mod: CPTII,S$GLB,, | Performed by: PHYSICIAN ASSISTANT

## 2023-06-22 PROCEDURE — 1160F PR REVIEW ALL MEDS BY PRESCRIBER/CLIN PHARMACIST DOCUMENTED: ICD-10-PCS | Mod: CPTII,S$GLB,, | Performed by: PHYSICIAN ASSISTANT

## 2023-06-22 PROCEDURE — 3074F PR MOST RECENT SYSTOLIC BLOOD PRESSURE < 130 MM HG: ICD-10-PCS | Mod: CPTII,S$GLB,, | Performed by: PHYSICIAN ASSISTANT

## 2023-06-22 PROCEDURE — 99214 OFFICE O/P EST MOD 30 MIN: CPT | Mod: S$GLB,,, | Performed by: PHYSICIAN ASSISTANT

## 2023-06-22 PROCEDURE — 3074F SYST BP LT 130 MM HG: CPT | Mod: CPTII,S$GLB,, | Performed by: PHYSICIAN ASSISTANT

## 2023-06-22 PROCEDURE — 99999 PR PBB SHADOW E&M-EST. PATIENT-LVL V: CPT | Mod: PBBFAC,,, | Performed by: PHYSICIAN ASSISTANT

## 2023-06-22 PROCEDURE — 99214 PR OFFICE/OUTPT VISIT, EST, LEVL IV, 30-39 MIN: ICD-10-PCS | Mod: S$GLB,,, | Performed by: PHYSICIAN ASSISTANT

## 2023-06-22 PROCEDURE — 3008F BODY MASS INDEX DOCD: CPT | Mod: CPTII,S$GLB,, | Performed by: PHYSICIAN ASSISTANT

## 2023-06-22 RX ORDER — MUPIROCIN 20 MG/G
OINTMENT TOPICAL
Qty: 30 G | Refills: 0 | Status: SHIPPED | OUTPATIENT
Start: 2023-06-22

## 2023-06-22 RX ORDER — PREDNISONE 20 MG/1
20 TABLET ORAL
COMMUNITY
Start: 2023-04-24

## 2023-06-22 NOTE — PROGRESS NOTES
[facility]    Subjective:      Patient: Valdemar Allen Patient PCP: Kyler Rodriguez MD         :  1990     Sex:  male      MRN:  [id number]          Date of Visit: 2023      Chief Complaint: Sinus issues      Patient ID 2022: Valdemar Allen is a 33 y.o. male who was referred to me by Dr. Kyler Rodriguez in consultation for anosmia. Pt tested positive for covid19 virus around 6 months ago. Pt states that he lost around 50% of taste and smell due to covid19 infection, which has never recovered. Pt did not have issues with taste or smell prior. Pt recently seen at ED 2022 for chest pain and was found to have bilateral serous effusion and acute sinusitis. Pt states that ear issues and sinus pain have resolved. He continues to have some mild frontal sinus pressure. Pt endorses some nasal congestion today in office. Pt was using flonase 1 puff to each nostril daily, but d/c'd a couple of days ago. Current sinonasal medications include none at present.  No seasonal allergies. He does not recall previously having allergy testing.  He denies a history of asthma.  He has not had sinonasal surgery.    He does not recall a prior history of nasal trauma.     Interval History 2022: Pt seen at last visit with hyposmia s/p covid19 infection 2021. Pt had sinus CT WO that showed zen mild scattered mucosal thickening of the paranasal sinuses with left anterior nasal septum deviation with right posterior nasal septal deviation. Pt was advised that this was unlikely to be the cause of his smell change and encouraged to do neilmed sinus rinse once daily for sinus hygiene. Pt was advised that he had post-viral decrease in smell and was advised to start olfactory training sheet to help improve smell function and advised that sometimes decreased smell secondary to viral infection can resolve or pt's can get some improvement in smell, but sometimes smell does not improve. Pt states today in office that  "he has been doing olfactory training and has had some improvement in his smell, but his smell is not back to baseline prior to covid19 infection. Pt states that he went to urgent care 3-4 weeks ago and had right sided head pain radiating from right forehead to right side of head and some new onset nasal congestion symptoms. Pt states he felt as though he was having some sinus headache. Pt was diagnosed with "sinus inflammation" at urgent care and pt was started on flonase and zyrtec for one week and states this did help with congestion issues. Pt states that his headaches are pulsatile in nature and usually effect the right side of his head. Pt states that he has been getting headaches once an hour and he states sometimes it is a hot pain or "bending" pain in the head and radiates around the frontal area of his head to the right side of head and sometimes behind his right ear and in occipital region of the head. Pt states that these headaches have increased in frequency. Pt endorses nasal congesiton. Pt denies fever/chills today. Pt denies rhinorrhea or PND. Pt denies seasonal allergy symptoms.     Interval History 06/22/2023: Pt seen at urgent care 2 months ago and treated for sinus infection and given 10 day course of antibiotics and given steroid nasal spray. Pt has been having light-headedness without dizziness or true vertigo. Pt states that she has sinus inflammation. Pt has had mild scant bleeding from nose with blood clots from right nostril 1 week ago when blowing nose. No romina nosebleed. Pt has been having issues with pressure around right jaw, but states that he has benefited from TMJD exercises provided to him at last office visit, but would like further management for his jaw.       Past Medical History  He has a past medical history of Chronic prostatitis, Elevated transaminase measurement, Herniated disc, Male pelvic pain, Pelvic muscle wasting, and Prostatitis.    Family History  His family history " includes Glaucoma in his mother; Heart disease in his maternal uncle; Hypertension in his father, maternal aunt, and maternal uncle.    Past Surgical History:   Procedure Laterality Date    ESOPHAGOGASTRODUODENOSCOPY N/A 5/21/2020    Procedure: EGD (ESOPHAGOGASTRODUODENOSCOPY);  Surgeon: Carmela Messina MD;  Location: [facility];  Service: Endoscopy;  Laterality: N/A;    TONSILLECTOMY       Social History     Tobacco Use    Smoking status: Never    Smokeless tobacco: Never   Substance and Sexual Activity    Alcohol use: Not Currently    Drug use: No    Sexual activity: Not Currently     Medications  He has a current medication list which includes the following prescription(s): acetaminophen, cetirizine, boostrix tdap, mometasone, mupirocin, pantoprazole, and prednisone.    Review of patient's allergies indicates:   Allergen Reactions    Augmentin [amoxicillin-pot clavulanate] Palpitations and Other (See Comments)     Patient states that he feels like is having a heart attack      All medications, allergies, and past history have been reviewed.    Objective:      Vitals:  Vitals - 1 value per visit 4/30/2023 6/22/2023 6/22/2023   SYSTOLIC 102 - 116   DIASTOLIC 64 - 76   Pulse 60 - 73   Temp - - 97.6   Resp - - 16   SPO2 98 - -   Weight (lb) - - 164.9   Weight (kg) - - 74.8   Height - - 67   BMI (Calculated) - - 25.8   VISIT REPORT - - -   Pain Score  - 0 -       Body surface area is 1.88 meters squared.  Physical Exam  Constitutional:       General: He is not in acute distress.     Appearance: Normal appearance. He is not ill-appearing.   HENT:      Head: Normocephalic and atraumatic.      Right Ear: Tympanic membrane, ear canal and external ear normal.      Left Ear: Tympanic membrane, ear canal and external ear normal.      Nose: Septal deviation (left anteriorly with right posteriorly) present. No congestion.      Right Sinus: No maxillary sinus tenderness or frontal sinus tenderness.      Left Sinus: No  maxillary sinus tenderness or frontal sinus tenderness.      Comments: Mild bilateral anterior nasal septal irritation with dry nasal mucosa.     Mouth/Throat:      Lips: Pink.      Mouth: Mucous membranes are moist.      Tongue: No lesions.      Palate: No lesions.      Pharynx: Oropharynx is clear. Uvula midline. No pharyngeal swelling, oropharyngeal exudate, posterior oropharyngeal erythema or uvula swelling.      Tonsils: No tonsillar exudate or tonsillar abscesses.   Eyes:      General:         Right eye: No discharge.         Left eye: No discharge.      Extraocular Movements: Extraocular movements intact.      Conjunctiva/sclera: Conjunctivae normal.   Pulmonary:      Effort: Pulmonary effort is normal.   Neurological:      General: No focal deficit present.      Mental Status: He is alert and oriented to person, place, and time. Mental status is at baseline.   Psychiatric:         Mood and Affect: Mood normal.         Behavior: Behavior normal.         Thought Content: Thought content normal.         Judgment: Judgment normal.     Labs:  WBC   Date Value Ref Range Status   04/30/2023 9.10 3.90 - 12.70 K/uL Final     Eosinophil %   Date Value Ref Range Status   04/30/2023 2.2 0.0 - 8.0 % Final     Eos #   Date Value Ref Range Status   04/30/2023 0.2 0.0 - 0.5 K/uL Final     Platelets   Date Value Ref Range Status   04/30/2023 218 150 - 450 K/uL Final     Glucose   Date Value Ref Range Status   04/30/2023 99 70 - 110 mg/dL Final     All lab results, imaging results, and data have been reviewed.    Assessment:        ICD-10-CM ICD-9-CM   1. Dry nose  J34.89 478.19   2. TMJPDS (temporomandibular joint pain dysfunction syndrome)  M26.629 524.69            Plan:      Recent CTA head and CT head WO 04/30/2023 reviewed and pt advised that he has essentially no sinus disease, especially around the time that he was treated for sinusitis. No area in need of cauterization today in office. Pt already has been evaluated  for tension like headache upon chart review by outside provider. Also believe that TMJD is causing muscle tension on right side. Proceed with follow up with physical therapy for TMJD as pt has benefited from at home exercises but would like further management.           Avoid medicated nasal sprays for 2 weeks. Use nasal saline 4-6 times a day for 2 weeks. When using medicated nasal sprays avoid pointing toward nasal septum as to avoid irritation/bleeding.   mupirocin (BACTROBAN) 2 % ointment 30 g 0 6/22/2023  --   Sig: Gently apply a thin layer to both nostrils with a clean q-tip three times a day for 2 weeks.     Follow up as needed for ENT issues/concerns.

## 2023-06-22 NOTE — PATIENT INSTRUCTIONS
Avoid medicated nasal sprays. Use nasal saline 4-6 times a day.  mupirocin (BACTROBAN) 2 % ointment 30 g 0 6/22/2023  --   Sig: Gently apply a thin layer to both nostrils with a clean q-tip three times a day for 2 weeks.

## 2024-03-01 ENCOUNTER — TELEPHONE (OUTPATIENT)
Dept: OTOLARYNGOLOGY | Facility: CLINIC | Age: 34
End: 2024-03-01
Payer: COMMERCIAL

## 2024-03-01 NOTE — TELEPHONE ENCOUNTER
1st attempt to contact pt to discuss upcoming ENT appt on 03/11 w/Agueda for TMJ/ear pain.  No answer; LVM for call back.  MyChart msg sent.

## 2024-03-11 NOTE — TELEPHONE ENCOUNTER
2nd attempt to contact pt regarding scheduled appt w/ENT.  No answer; LVM informing that provider is out sick today and that appt is cancelled for today.  Also addressed in voicemail to call our office back in regards to possible r/s; previously sent MyChart informed that ENT providers don't treat TMJ.  Pt has not seen msg since it was sent; last activity on pt portal -- 02/19/24.

## 2024-10-23 ENCOUNTER — OFFICE VISIT (OUTPATIENT)
Dept: URGENT CARE | Facility: CLINIC | Age: 34
End: 2024-10-23
Payer: COMMERCIAL

## 2024-10-23 VITALS
TEMPERATURE: 99 F | DIASTOLIC BLOOD PRESSURE: 81 MMHG | OXYGEN SATURATION: 98 % | BODY MASS INDEX: 29.03 KG/M2 | SYSTOLIC BLOOD PRESSURE: 123 MMHG | HEIGHT: 67 IN | RESPIRATION RATE: 16 BRPM | HEART RATE: 72 BPM | WEIGHT: 185 LBS

## 2024-10-23 DIAGNOSIS — R07.81 RIB PAIN ON LEFT SIDE: Primary | ICD-10-CM

## 2024-10-23 PROCEDURE — 99214 OFFICE O/P EST MOD 30 MIN: CPT | Mod: S$GLB,,,
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
## 2024-10-23 NOTE — PATIENT INSTRUCTIONS
Emergency room precautions, or see PCP for further evaluation of left upper quadrant abdominal pain.    May also try over-the-counter medications such as Pepcid, or Pepto.

## 2024-10-30 ENCOUNTER — OFFICE VISIT (OUTPATIENT)
Dept: FAMILY MEDICINE | Facility: CLINIC | Age: 34
End: 2024-10-30
Payer: COMMERCIAL

## 2024-10-30 VITALS
WEIGHT: 186.38 LBS | DIASTOLIC BLOOD PRESSURE: 78 MMHG | OXYGEN SATURATION: 98 % | HEIGHT: 67 IN | BODY MASS INDEX: 29.25 KG/M2 | SYSTOLIC BLOOD PRESSURE: 110 MMHG | TEMPERATURE: 98 F | HEART RATE: 86 BPM

## 2024-10-30 DIAGNOSIS — Z00.00 HEALTHCARE MAINTENANCE: ICD-10-CM

## 2024-10-30 DIAGNOSIS — M51.9 LUMBAR DISC DISEASE: ICD-10-CM

## 2024-10-30 DIAGNOSIS — Z11.4 SCREENING FOR HIV (HUMAN IMMUNODEFICIENCY VIRUS): ICD-10-CM

## 2024-10-30 DIAGNOSIS — R14.2 BELCHING: ICD-10-CM

## 2024-10-30 DIAGNOSIS — R10.12 LUQ PAIN: ICD-10-CM

## 2024-10-30 DIAGNOSIS — K21.9 GASTROESOPHAGEAL REFLUX DISEASE, UNSPECIFIED WHETHER ESOPHAGITIS PRESENT: ICD-10-CM

## 2024-10-30 DIAGNOSIS — Z76.89 ENCOUNTER TO ESTABLISH CARE: Primary | ICD-10-CM

## 2024-10-30 PROCEDURE — 99999 PR PBB SHADOW E&M-EST. PATIENT-LVL III: CPT | Mod: PBBFAC,,, | Performed by: NURSE PRACTITIONER

## 2024-10-30 RX ORDER — AMOXICILLIN AND CLAVULANATE POTASSIUM 875; 125 MG/1; MG/1
1 TABLET, FILM COATED ORAL 2 TIMES DAILY
COMMUNITY
Start: 2024-10-28

## 2024-10-30 RX ORDER — CIPROFLOXACIN AND DEXAMETHASONE 3; 1 MG/ML; MG/ML
4 SUSPENSION/ DROPS AURICULAR (OTIC) 2 TIMES DAILY
COMMUNITY
Start: 2024-10-28

## 2024-11-03 ENCOUNTER — LAB VISIT (OUTPATIENT)
Dept: LAB | Facility: HOSPITAL | Age: 34
End: 2024-11-03
Attending: NURSE PRACTITIONER
Payer: COMMERCIAL

## 2024-11-03 DIAGNOSIS — K21.9 GASTROESOPHAGEAL REFLUX DISEASE, UNSPECIFIED WHETHER ESOPHAGITIS PRESENT: ICD-10-CM

## 2024-11-03 DIAGNOSIS — R14.2 BELCHING: ICD-10-CM

## 2024-11-03 DIAGNOSIS — Z11.4 SCREENING FOR HIV (HUMAN IMMUNODEFICIENCY VIRUS): ICD-10-CM

## 2024-11-03 DIAGNOSIS — R10.12 LUQ PAIN: ICD-10-CM

## 2024-11-03 DIAGNOSIS — Z00.00 HEALTHCARE MAINTENANCE: ICD-10-CM

## 2024-11-03 LAB
ALBUMIN SERPL BCP-MCNC: 4.8 G/DL (ref 3.5–5.2)
ALP SERPL-CCNC: 47 U/L (ref 55–135)
ALT SERPL W/O P-5'-P-CCNC: 25 U/L (ref 10–44)
ANION GAP SERPL CALC-SCNC: 5 MMOL/L (ref 8–16)
AST SERPL-CCNC: 21 U/L (ref 10–40)
BASOPHILS # BLD AUTO: 0.04 K/UL (ref 0–0.2)
BASOPHILS NFR BLD: 0.7 % (ref 0–1.9)
BILIRUB SERPL-MCNC: 0.6 MG/DL (ref 0.1–1)
BUN SERPL-MCNC: 13 MG/DL (ref 6–20)
CALCIUM SERPL-MCNC: 9.9 MG/DL (ref 8.7–10.5)
CHLORIDE SERPL-SCNC: 103 MMOL/L (ref 95–110)
CHOLEST SERPL-MCNC: 185 MG/DL (ref 120–199)
CHOLEST/HDLC SERPL: 4.2 {RATIO} (ref 2–5)
CO2 SERPL-SCNC: 32 MMOL/L (ref 23–29)
CREAT SERPL-MCNC: 1.1 MG/DL (ref 0.5–1.4)
DIFFERENTIAL METHOD BLD: NORMAL
EOSINOPHIL # BLD AUTO: 0.2 K/UL (ref 0–0.5)
EOSINOPHIL NFR BLD: 3.6 % (ref 0–8)
ERYTHROCYTE [DISTWIDTH] IN BLOOD BY AUTOMATED COUNT: 13.1 % (ref 11.5–14.5)
EST. GFR  (NO RACE VARIABLE): >60 ML/MIN/1.73 M^2
GLUCOSE SERPL-MCNC: 88 MG/DL (ref 70–110)
HCT VFR BLD AUTO: 46.2 % (ref 40–54)
HDLC SERPL-MCNC: 44 MG/DL (ref 40–75)
HDLC SERPL: 23.8 % (ref 20–50)
HGB BLD-MCNC: 14.9 G/DL (ref 14–18)
IMM GRANULOCYTES # BLD AUTO: 0.01 K/UL (ref 0–0.04)
IMM GRANULOCYTES NFR BLD AUTO: 0.2 % (ref 0–0.5)
LDLC SERPL CALC-MCNC: 122.6 MG/DL (ref 63–159)
LYMPHOCYTES # BLD AUTO: 2.8 K/UL (ref 1–4.8)
LYMPHOCYTES NFR BLD: 46.3 % (ref 18–48)
MCH RBC QN AUTO: 27.5 PG (ref 27–31)
MCHC RBC AUTO-ENTMCNC: 32.3 G/DL (ref 32–36)
MCV RBC AUTO: 85 FL (ref 82–98)
MONOCYTES # BLD AUTO: 0.6 K/UL (ref 0.3–1)
MONOCYTES NFR BLD: 10 % (ref 4–15)
NEUTROPHILS # BLD AUTO: 2.4 K/UL (ref 1.8–7.7)
NEUTROPHILS NFR BLD: 39.2 % (ref 38–73)
NONHDLC SERPL-MCNC: 141 MG/DL
NRBC BLD-RTO: 0 /100 WBC
PLATELET # BLD AUTO: 174 K/UL (ref 150–450)
PMV BLD AUTO: 10.7 FL (ref 9.2–12.9)
POTASSIUM SERPL-SCNC: 4.6 MMOL/L (ref 3.5–5.1)
PROT SERPL-MCNC: 8.1 G/DL (ref 6–8.4)
RBC # BLD AUTO: 5.41 M/UL (ref 4.6–6.2)
SODIUM SERPL-SCNC: 140 MMOL/L (ref 136–145)
TRIGL SERPL-MCNC: 92 MG/DL (ref 30–150)
TSH SERPL DL<=0.005 MIU/L-ACNC: 0.68 UIU/ML (ref 0.34–5.6)
WBC # BLD AUTO: 6.13 K/UL (ref 3.9–12.7)

## 2024-11-03 PROCEDURE — 84443 ASSAY THYROID STIM HORMONE: CPT | Performed by: NURSE PRACTITIONER

## 2024-11-03 PROCEDURE — 80053 COMPREHEN METABOLIC PANEL: CPT | Performed by: NURSE PRACTITIONER

## 2024-11-03 PROCEDURE — 87338 HPYLORI STOOL AG IA: CPT | Performed by: NURSE PRACTITIONER

## 2024-11-03 PROCEDURE — 87389 HIV-1 AG W/HIV-1&-2 AB AG IA: CPT | Performed by: NURSE PRACTITIONER

## 2024-11-03 PROCEDURE — 85025 COMPLETE CBC W/AUTO DIFF WBC: CPT | Performed by: NURSE PRACTITIONER

## 2024-11-03 PROCEDURE — 36415 COLL VENOUS BLD VENIPUNCTURE: CPT | Performed by: NURSE PRACTITIONER

## 2024-11-03 PROCEDURE — 80061 LIPID PANEL: CPT | Performed by: NURSE PRACTITIONER

## 2024-11-05 LAB
H PYLORI AG STL QL IA: NEGATIVE
HIV 1+2 AB+HIV1 P24 AG SERPL QL IA: NON REACTIVE

## 2024-12-03 ENCOUNTER — OFFICE VISIT (OUTPATIENT)
Dept: FAMILY MEDICINE | Facility: CLINIC | Age: 34
End: 2024-12-03
Payer: COMMERCIAL

## 2024-12-03 VITALS
HEIGHT: 67 IN | WEIGHT: 185.81 LBS | OXYGEN SATURATION: 98 % | SYSTOLIC BLOOD PRESSURE: 118 MMHG | DIASTOLIC BLOOD PRESSURE: 72 MMHG | BODY MASS INDEX: 29.16 KG/M2 | HEART RATE: 82 BPM | TEMPERATURE: 98 F

## 2024-12-03 DIAGNOSIS — M79.642 BILATERAL HAND PAIN: ICD-10-CM

## 2024-12-03 DIAGNOSIS — M79.641 BILATERAL HAND PAIN: ICD-10-CM

## 2024-12-03 DIAGNOSIS — K21.9 GASTROESOPHAGEAL REFLUX DISEASE, UNSPECIFIED WHETHER ESOPHAGITIS PRESENT: ICD-10-CM

## 2024-12-03 DIAGNOSIS — R10.12 LUQ PAIN: Primary | ICD-10-CM

## 2024-12-03 PROCEDURE — 99214 OFFICE O/P EST MOD 30 MIN: CPT | Mod: S$GLB,,, | Performed by: NURSE PRACTITIONER

## 2024-12-03 PROCEDURE — 3008F BODY MASS INDEX DOCD: CPT | Mod: CPTII,S$GLB,, | Performed by: NURSE PRACTITIONER

## 2024-12-03 PROCEDURE — 1159F MED LIST DOCD IN RCRD: CPT | Mod: CPTII,S$GLB,, | Performed by: NURSE PRACTITIONER

## 2024-12-03 PROCEDURE — 3074F SYST BP LT 130 MM HG: CPT | Mod: CPTII,S$GLB,, | Performed by: NURSE PRACTITIONER

## 2024-12-03 PROCEDURE — 99999 PR PBB SHADOW E&M-EST. PATIENT-LVL IV: CPT | Mod: PBBFAC,,, | Performed by: NURSE PRACTITIONER

## 2024-12-03 PROCEDURE — 1160F RVW MEDS BY RX/DR IN RCRD: CPT | Mod: CPTII,S$GLB,, | Performed by: NURSE PRACTITIONER

## 2024-12-03 PROCEDURE — 3078F DIAST BP <80 MM HG: CPT | Mod: CPTII,S$GLB,, | Performed by: NURSE PRACTITIONER

## 2024-12-03 NOTE — PROGRESS NOTES
SUBJECTIVE:      Patient ID: Valdemar Allen is a 34 y.o. male.    Chief Complaint: Follow-up (1 mon f/u)    History of Present Illness    CHIEF COMPLAINT:  Mr. Allen presents for an annual wellness visit and to discuss recent lab results.    HPI:  Mr. Allen reports improvement in abdominal pain after a 2-week course of Pepcid, with resolution of symptoms after discontinuation. He has right ear discomfort over the past couple of days, attributed to nocturnal bruxism causing fluid accumulation. This ear issue has occurred for the last couple of years, typically managed with Claritin for about a week. He initiated Claritin treatment 3 days ago. Recent foot pain, attributed to wearing old footwear, has improved significantly with new shoes. Mr. Allen has a chronic hand condition stemming from a previous injury. While wrist rehabilitation through carpal tunnel stretches was successful, he continues to have hand discomfort, characterized as stiffness with possible arthritic features. He attributes this to prolonged jayden in cold conditions with poor circulation, leading to discomfort during gameplay. Mr. Allen is concerned about potential long-term implications of this issue.    MEDICATIONS:  Mr. Allen has been on Pepcid for 2 weeks to address abdominal pain, which has been effective in reducing discomfort. He has been taking Claritin for about a week when his ear bothers him due to grinding at night, which has been effective in reducing ear discomfort. Mr. Allen started Zyrtec 3 days prior to the visit for ear fluid related to nighttime grinding.    MEDICAL HISTORY:  Mr. Allen has a history of hand injury from years ago, possibly due to arthritis or repetitive strain from playing video games.    TEST RESULTS:  Mr. Allen recently underwent several labs. His H. pylori test was negative. Cholesterol, kidney function, liver function, and electrolytes were all within normal limits. HIV screening came back  negative. The CBC and thyroid function tests were normal.    SOCIAL HISTORY:  Mr. Allen's job requires prolonged standing.        Review of Systems   Constitutional:  Negative for activity change, appetite change, chills, diaphoresis, fatigue, fever and unexpected weight change.   HENT:  Negative for congestion, ear pain, sinus pressure, sore throat, trouble swallowing and voice change.    Eyes:  Negative for pain, discharge and visual disturbance.   Respiratory:  Negative for cough, chest tightness, shortness of breath and wheezing.    Cardiovascular:  Negative for chest pain and palpitations.   Gastrointestinal:  Negative for abdominal pain, constipation, diarrhea, nausea and vomiting.   Genitourinary:  Negative for difficulty urinating, flank pain, frequency and urgency.   Musculoskeletal:  Positive for arthralgias. Negative for back pain, joint swelling and neck pain.   Skin:  Negative for color change and rash.   Neurological:  Negative for dizziness, seizures, syncope, weakness, numbness and headaches.   Hematological:  Negative for adenopathy.   Psychiatric/Behavioral:  Negative for dysphoric mood and sleep disturbance. The patient is not nervous/anxious.        Family History   Problem Relation Name Age of Onset    Hypertension Father      Glaucoma Mother      Hypertension Maternal Aunt      Hypertension Maternal Uncle      Heart disease Maternal Uncle      Amblyopia Neg Hx      Blindness Neg Hx      Cataracts Neg Hx      Macular degeneration Neg Hx      Retinal detachment Neg Hx      Strabismus Neg Hx      Heart attack Neg Hx      Heart failure Neg Hx        Social History     Socioeconomic History    Marital status:    Tobacco Use    Smoking status: Never    Smokeless tobacco: Never   Substance and Sexual Activity    Alcohol use: Not Currently    Drug use: No    Sexual activity: Not Currently     Social Drivers of Health     Financial Resource Strain: Low Risk  (10/30/2024)    Overall Financial  Resource Strain (CARDIA)     Difficulty of Paying Living Expenses: Not hard at all   Food Insecurity: No Food Insecurity (10/30/2024)    Hunger Vital Sign     Worried About Running Out of Food in the Last Year: Never true     Ran Out of Food in the Last Year: Never true   Transportation Needs: No Transportation Needs (4/7/2020)    PRAPARE - Transportation     Lack of Transportation (Medical): No     Lack of Transportation (Non-Medical): No   Physical Activity: Insufficiently Active (10/30/2024)    Exercise Vital Sign     Days of Exercise per Week: 2 days     Minutes of Exercise per Session: 30 min   Stress: No Stress Concern Present (10/30/2024)    [employer] [place] [employer] - Occupational Stress Questionnaire     Feeling of Stress : Not at all   Housing Stability: Unknown (10/30/2024)    Housing Stability Vital Sign     Unable to Pay for Housing in the Last Year: No     Current Outpatient Medications   Medication Sig Dispense Refill    cetirizine (ZYRTEC) 10 MG tablet Take 1 tablet (10 mg total) by mouth once daily. 30 tablet 2    mometasone (NASONEX) 50 mcg/actuation nasal spray 2 sprays by Nasal route once daily. 17 g 0     No current facility-administered medications for this visit.     Review of patient's allergies indicates:   Allergen Reactions    Augmentin [amoxicillin-pot clavulanate] Palpitations and Other (See Comments)     Patient states that he feels like is having a heart attack       Past Medical History:   Diagnosis Date    Chronic prostatitis 10/16/2012    Elevated transaminase measurement 9/14/2012    Herniated disc     Male pelvic pain 5/2/2013    Pelvic muscle wasting 5/9/2013    Prostatitis      Past Surgical History:   Procedure Laterality Date    ESOPHAGOGASTRODUODENOSCOPY N/A 5/21/2020    Procedure: EGD (ESOPHAGOGASTRODUODENOSCOPY);  Surgeon: Carmela Messina MD;  Location: [facility];  Service: Endoscopy;  Laterality: N/A;    TONSILLECTOMY            OBJECTIVE:      Vitals:  "   12/03/24 0942   BP: 118/72   BP Location: Left arm   Patient Position: Sitting   Pulse: 82   Temp: 98.1 °F (36.7 °C)   TempSrc: Oral   SpO2: 98%   Weight: 84.3 kg (185 lb 12.8 oz)   Height: 5' 7" (1.702 m)     Physical Exam  Vitals and nursing note reviewed.   Constitutional:       General: He is awake. He is not in acute distress.     Appearance: He is well-developed, well-groomed and overweight. He is not ill-appearing, toxic-appearing or diaphoretic.   HENT:      Head: Normocephalic and atraumatic.      Right Ear: Tympanic membrane, ear canal and external ear normal.      Left Ear: Tympanic membrane, ear canal and external ear normal.      Nose: Nose normal.   Eyes:      General: Lids are normal. Gaze aligned appropriately.      Conjunctiva/sclera: Conjunctivae normal.      Right eye: Right conjunctiva is not injected.      Left eye: Left conjunctiva is not injected.      Pupils: Pupils are equal, round, and reactive to light.   Cardiovascular:      Rate and Rhythm: Normal rate and regular rhythm.      Pulses: Normal pulses.      Heart sounds: Normal heart sounds, S1 normal and S2 normal. No murmur heard.     No friction rub. No gallop.   Pulmonary:      Effort: Pulmonary effort is normal. No respiratory distress.      Breath sounds: Normal breath sounds. No stridor. No decreased breath sounds, wheezing, rhonchi or rales.   Chest:      Chest wall: No tenderness.   Musculoskeletal:      Cervical back: Neck supple.      Right lower leg: No edema.      Left lower leg: No edema.   Lymphadenopathy:      Cervical: No cervical adenopathy.   Skin:     General: Skin is warm and dry.      Capillary Refill: Capillary refill takes less than 2 seconds.      Findings: No erythema or rash.   Neurological:      Mental Status: He is alert and oriented to person, place, and time. Mental status is at baseline.   Psychiatric:         Attention and Perception: Attention normal.         Mood and Affect: Mood normal.         Speech: " Speech normal.         Behavior: Behavior normal. Behavior is cooperative.         Thought Content: Thought content normal.         Judgment: Judgment normal.       No visits with results within 1 Month(s) from this visit.   Latest known visit with results is:   Lab Visit on 11/03/2024   Component Date Value Ref Range Status    H pylori Antigen Stool 11/03/2024 Negative  Negative Final    Comment: Performed at:  [facility]  [id number]  : Rodo Griffith MD, Phone:  [phone]      WBC 11/03/2024 6.13  3.90 - 12.70 K/uL Final    RBC 11/03/2024 5.41  4.60 - 6.20 M/uL Final    Hemoglobin 11/03/2024 14.9  14.0 - 18.0 g/dL Final    Hematocrit 11/03/2024 46.2  40.0 - 54.0 % Final    MCV 11/03/2024 85  82 - 98 fL Final    MCH 11/03/2024 27.5  27.0 - 31.0 pg Final    MCHC 11/03/2024 32.3  32.0 - 36.0 g/dL Final    RDW 11/03/2024 13.1  11.5 - 14.5 % Final    Platelets 11/03/2024 174  150 - 450 K/uL Final    MPV 11/03/2024 10.7  9.2 - 12.9 fL Final    Immature Granulocytes 11/03/2024 0.2  0.0 - 0.5 % Final    Gran # (ANC) 11/03/2024 2.4  1.8 - 7.7 K/uL Final    Immature Grans (Abs) 11/03/2024 0.01  0.00 - 0.04 K/uL Final    Comment: Mild elevation in immature granulocytes is non specific and   can be seen in a variety of conditions including stress response,   acute inflammation, trauma and pregnancy. Correlation with other   laboratory and clinical findings is essential.      Lymph # 11/03/2024 2.8  1.0 - 4.8 K/uL Final    Mono # 11/03/2024 0.6  0.3 - 1.0 K/uL Final    Eos # 11/03/2024 0.2  0.0 - 0.5 K/uL Final    Baso # 11/03/2024 0.04  0.00 - 0.20 K/uL Final    nRBC 11/03/2024 0  0 /100 WBC Final    Gran % 11/03/2024 39.2  38.0 - 73.0 % Final    Lymph % 11/03/2024 46.3  18.0 - 48.0 % Final    Mono % 11/03/2024 10.0  4.0 - 15.0 % Final    Eosinophil % 11/03/2024 3.6  0.0 - 8.0 % Final    Basophil % 11/03/2024 0.7  0.0 - 1.9 % Final    Differential Method  11/03/2024 Automated   Final    Sodium 11/03/2024 140  136 - 145 mmol/L Final    Potassium 11/03/2024 4.6  3.5 - 5.1 mmol/L Final    Chloride 11/03/2024 103  95 - 110 mmol/L Final    CO2 11/03/2024 32 (H)  23 - 29 mmol/L Final    Glucose 11/03/2024 88  70 - 110 mg/dL Final    BUN 11/03/2024 13  6 - 20 mg/dL Final    Creatinine 11/03/2024 1.1  0.5 - 1.4 mg/dL Final    Calcium 11/03/2024 9.9  8.7 - 10.5 mg/dL Final    Total Protein 11/03/2024 8.1  6.0 - 8.4 g/dL Final    Albumin 11/03/2024 4.8  3.5 - 5.2 g/dL Final    Total Bilirubin 11/03/2024 0.6  0.1 - 1.0 mg/dL Final    Comment: For infants and newborns, interpretation of results should be based  on gestational age, weight and in agreement with clinical  observations.    Premature Infant recommended reference ranges:  Up to 24 hours.............<8.0 mg/dL  Up to 48 hours............<12.0 mg/dL  3-5 days..................<15.0 mg/dL  6-29 days.................<15.0 mg/dL      Alkaline Phosphatase 11/03/2024 47 (L)  55 - 135 U/L Final    AST 11/03/2024 21  10 - 40 U/L Final    ALT 11/03/2024 25  10 - 44 U/L Final    eGFR 11/03/2024 >60.0  >60 mL/min/1.73 m^2 Final    Anion Gap 11/03/2024 5 (L)  8 - 16 mmol/L Final    Cholesterol 11/03/2024 185  120 - 199 mg/dL Final    Comment: The National Cholesterol Education Program (NCEP) has set the  following guidelines (reference ranges) for Cholesterol:  Optimal.....................<200 mg/dL  Borderline High.............200-239 mg/dL  High........................> or = 240 mg/dL      Triglycerides 11/03/2024 92  30 - 150 mg/dL Final    Comment: The National Cholesterol Education Program (NCEP) has set the  following guidelines (reference values) for triglycerides:  Normal......................<150 mg/dL  Borderline High.............150-199 mg/dL  High........................200-499 mg/dL      HDL 11/03/2024 44  40 - 75 mg/dL Final    Comment: The National Cholesterol Education Program (NCEP) has set the  following  guidelines (reference values) for HDL Cholesterol:  Low...............<40 mg/dL  Optimal...........>60 mg/dL      LDL Cholesterol 11/03/2024 122.6  63.0 - 159.0 mg/dL Final    Comment: The National Cholesterol Education Program (NCEP) has set the  following guidelines (reference values) for LDL Cholesterol:  Optimal.......................<130 mg/dL  Borderline High...............130-159 mg/dL  High..........................160-189 mg/dL  Very High.....................>190 mg/dL      HDL/Cholesterol Ratio 11/03/2024 23.8  20.0 - 50.0 % Final    Total Cholesterol/HDL Ratio 11/03/2024 4.2  2.0 - 5.0 Final    Non-HDL Cholesterol 11/03/2024 141  mg/dL Final    Comment: Risk category and Non-HDL cholesterol goals:  Coronary heart disease (CHD)or equivalent (10-year risk of CHD >20%):  Non-HDL cholesterol goal     <130 mg/dL  Two or more CHD risk factors and 10-year risk of CHD <= 20%:  Non-HDL cholesterol goal     <160 mg/dL  0 to 1 CHD risk factor:  Non-HDL cholesterol goal     <190 mg/dL      TSH 11/03/2024 0.683  0.340 - 5.600 uIU/mL Final    HIV Screen 4th Generation wRfx 11/03/2024 Non Reactive  Non Reactive Final    Comment: HIV-1/HIV-2 antibodies and HIV-1 p24 antigen  were NOT detected. There is no laboratory  evidence of HIV infection.  HIV Negative  Performed at:  [facility]  [id number]  : Rodo Griffith MD, Phone:  [phone]            Assessment:       1. LUQ pain    2. Gastroesophageal reflux disease, unspecified whether esophagitis present    3. Bilateral hand pain        Plan:       Assessment & Plan    Reviewed lab results: H. pylori negative, cholesterol, kidney function, liver function, electrolytes, HIV screening, CBC, and thyroid all normal  Evaluated patient's report of improved abdominal pain after 2-week course of famotidine  Considered reflux as potential cause of previous abdominal pain  Assessed right ear for fluid accumulation, found  to be clear at present  Evaluated hand discomfort, considering arthritis as potential cause    PAIN IN JOINTS OF RIGHT HAND (ARTHRITIS):  - Explained that arthritis, once set in, cannot be reversed but progression can be slowed.  - Discussed potential symptoms of arthritis progression, including morning stiffness, weaker , and difficulty opening jars.  - Mr. Allen to limit repetitive hand motions that may exacerbate hand discomfort, including reducing screen time for jayden.  - Take OTC anti-inflammatories like Tylenol Arthritis or ibuprofen as needed for hand discomfort.    GASTRO-ESOPHAGEAL REFLUX DISEASE:  - Discontinued Pepcid (famotidine) after 2-week course for abdominal pain.  - Restart Pepcid if abdominal pain recurs.    FLUID IN EAR:  - Continue Zyrtec (cetirizine) daily to prevent fluid accumulation in ears.    FOLLOW-UP:  - Follow up in 1 year for annual physical.        LUQ pain    Gastroesophageal reflux disease, unspecified whether esophagitis present    Bilateral hand pain    I spent a total of 15 minutes on the day of the visit.This includes face to face time and non-face to face time preparing to see the patient (eg, review of tests), obtaining and/or reviewing separately obtained history, documenting clinical information in the electronic or other health record, independently interpreting results and communicating results to the patient/family/caregiver, or care coordinator.    Follow up in about 1 year (around 12/3/2025) for Annual Physical.          12/3/2024 ANDREY Dodge, MARIUMP    This note was generated with the assistance of ambient listening technology. Verbal consent was obtained by the patient and accompanying visitor(s) for the recording of patient appointment to facilitate this note. I attest to having reviewed and edited the generated note for accuracy, though some syntax or spelling errors may persist. Please contact the author of this note for any clarification.

## 2025-02-16 ENCOUNTER — OFFICE VISIT (OUTPATIENT)
Dept: URGENT CARE | Facility: CLINIC | Age: 35
End: 2025-02-16
Payer: COMMERCIAL

## 2025-02-16 ENCOUNTER — HOSPITAL ENCOUNTER (EMERGENCY)
Facility: HOSPITAL | Age: 35
Discharge: HOME | End: 2025-02-16
Attending: EMERGENCY MEDICINE
Payer: COMMERCIAL

## 2025-02-16 VITALS
HEART RATE: 86 BPM | OXYGEN SATURATION: 97 % | TEMPERATURE: 98 F | SYSTOLIC BLOOD PRESSURE: 131 MMHG | RESPIRATION RATE: 18 BRPM | DIASTOLIC BLOOD PRESSURE: 87 MMHG | BODY MASS INDEX: 29.03 KG/M2 | WEIGHT: 185 LBS | HEIGHT: 67 IN

## 2025-02-16 VITALS
RESPIRATION RATE: 18 BRPM | TEMPERATURE: 98 F | BODY MASS INDEX: 29.03 KG/M2 | HEIGHT: 67 IN | DIASTOLIC BLOOD PRESSURE: 85 MMHG | SYSTOLIC BLOOD PRESSURE: 132 MMHG | HEART RATE: 77 BPM | WEIGHT: 185 LBS | OXYGEN SATURATION: 99 %

## 2025-02-16 DIAGNOSIS — R42 DIZZINESS: Primary | ICD-10-CM

## 2025-02-16 DIAGNOSIS — R20.0 FACIAL NUMBNESS: ICD-10-CM

## 2025-02-16 DIAGNOSIS — R29.818 ACUTE FOCAL NEUROLOGICAL DEFICIT: ICD-10-CM

## 2025-02-16 DIAGNOSIS — H92.01 ACUTE OTALGIA, RIGHT: ICD-10-CM

## 2025-02-16 LAB
ALBUMIN SERPL BCP-MCNC: 4.1 G/DL (ref 3.5–5.2)
ALP SERPL-CCNC: 51 U/L (ref 40–150)
ALT SERPL W/O P-5'-P-CCNC: 33 U/L (ref 10–44)
ANION GAP SERPL CALC-SCNC: 11 MMOL/L (ref 8–16)
AST SERPL-CCNC: 17 U/L (ref 10–40)
BASOPHILS # BLD AUTO: 0.04 K/UL (ref 0–0.2)
BASOPHILS NFR BLD: 0.8 % (ref 0–1.9)
BILIRUB SERPL-MCNC: 0.4 MG/DL (ref 0.1–1)
BUN SERPL-MCNC: 13 MG/DL (ref 6–20)
CALCIUM SERPL-MCNC: 9.4 MG/DL (ref 8.7–10.5)
CHLORIDE SERPL-SCNC: 100 MMOL/L (ref 95–110)
CHOLEST SERPL-MCNC: 186 MG/DL (ref 120–199)
CHOLEST/HDLC SERPL: 4.9 {RATIO} (ref 2–5)
CO2 SERPL-SCNC: 27 MMOL/L (ref 23–29)
CREAT SERPL-MCNC: 1.1 MG/DL (ref 0.5–1.4)
DIFFERENTIAL METHOD BLD: ABNORMAL
EOSINOPHIL # BLD AUTO: 0.1 K/UL (ref 0–0.5)
EOSINOPHIL NFR BLD: 2.4 % (ref 0–8)
ERYTHROCYTE [DISTWIDTH] IN BLOOD BY AUTOMATED COUNT: 12.7 % (ref 11.5–14.5)
EST. GFR  (NO RACE VARIABLE): >60 ML/MIN/1.73 M^2
GLUCOSE SERPL-MCNC: 116 MG/DL (ref 70–110)
HCT VFR BLD AUTO: 44.8 % (ref 40–54)
HCV AB SERPL QL IA: NEGATIVE
HDLC SERPL-MCNC: 38 MG/DL (ref 40–75)
HDLC SERPL: 20.4 % (ref 20–50)
HGB BLD-MCNC: 14.3 G/DL (ref 14–18)
IMM GRANULOCYTES # BLD AUTO: 0.01 K/UL (ref 0–0.04)
IMM GRANULOCYTES NFR BLD AUTO: 0.2 % (ref 0–0.5)
INR PPP: 1.1 (ref 0.8–1.2)
LDLC SERPL CALC-MCNC: 119.4 MG/DL (ref 63–159)
LYMPHOCYTES # BLD AUTO: 1.7 K/UL (ref 1–4.8)
LYMPHOCYTES NFR BLD: 33.9 % (ref 18–48)
MCH RBC QN AUTO: 27 PG (ref 27–31)
MCHC RBC AUTO-ENTMCNC: 31.9 G/DL (ref 32–36)
MCV RBC AUTO: 85 FL (ref 82–98)
MONOCYTES # BLD AUTO: 0.4 K/UL (ref 0.3–1)
MONOCYTES NFR BLD: 8.9 % (ref 4–15)
NEUTROPHILS # BLD AUTO: 2.7 K/UL (ref 1.8–7.7)
NEUTROPHILS NFR BLD: 53.8 % (ref 38–73)
NONHDLC SERPL-MCNC: 148 MG/DL
NRBC BLD-RTO: 0 /100 WBC
OHS QRS DURATION: 88 MS
OHS QTC CALCULATION: 396 MS
PLATELET # BLD AUTO: 183 K/UL (ref 150–450)
PMV BLD AUTO: 10 FL (ref 9.2–12.9)
POTASSIUM SERPL-SCNC: 3.7 MMOL/L (ref 3.5–5.1)
PROT SERPL-MCNC: 7.7 G/DL (ref 6–8.4)
PROTHROMBIN TIME: 12.2 SEC (ref 9–12.5)
RBC # BLD AUTO: 5.3 M/UL (ref 4.6–6.2)
SARS-COV-2 RDRP RESP QL NAA+PROBE: NEGATIVE
SODIUM SERPL-SCNC: 138 MMOL/L (ref 136–145)
TRIGL SERPL-MCNC: 143 MG/DL (ref 30–150)
TSH SERPL DL<=0.005 MIU/L-ACNC: 0.53 UIU/ML (ref 0.4–4)
WBC # BLD AUTO: 4.93 K/UL (ref 3.9–12.7)

## 2025-02-16 PROCEDURE — 80061 LIPID PANEL: CPT | Performed by: EMERGENCY MEDICINE

## 2025-02-16 PROCEDURE — 96360 HYDRATION IV INFUSION INIT: CPT

## 2025-02-16 PROCEDURE — 96361 HYDRATE IV INFUSION ADD-ON: CPT

## 2025-02-16 PROCEDURE — 93005 ELECTROCARDIOGRAM TRACING: CPT

## 2025-02-16 PROCEDURE — 94760 N-INVAS EAR/PLS OXIMETRY 1: CPT

## 2025-02-16 PROCEDURE — 84443 ASSAY THYROID STIM HORMONE: CPT | Performed by: EMERGENCY MEDICINE

## 2025-02-16 PROCEDURE — 80053 COMPREHEN METABOLIC PANEL: CPT | Performed by: EMERGENCY MEDICINE

## 2025-02-16 PROCEDURE — 25000003 PHARM REV CODE 250: Performed by: EMERGENCY MEDICINE

## 2025-02-16 PROCEDURE — 25500020 PHARM REV CODE 255

## 2025-02-16 PROCEDURE — 99285 EMERGENCY DEPT VISIT HI MDM: CPT | Mod: 25

## 2025-02-16 PROCEDURE — 85025 COMPLETE CBC W/AUTO DIFF WBC: CPT | Performed by: EMERGENCY MEDICINE

## 2025-02-16 PROCEDURE — 86803 HEPATITIS C AB TEST: CPT | Performed by: EMERGENCY MEDICINE

## 2025-02-16 PROCEDURE — 87635 SARS-COV-2 COVID-19 AMP PRB: CPT | Performed by: EMERGENCY MEDICINE

## 2025-02-16 PROCEDURE — 85610 PROTHROMBIN TIME: CPT | Performed by: EMERGENCY MEDICINE

## 2025-02-16 PROCEDURE — 36415 COLL VENOUS BLD VENIPUNCTURE: CPT | Performed by: EMERGENCY MEDICINE

## 2025-02-16 RX ORDER — MECLIZINE HYDROCHLORIDE 25 MG/1
25 TABLET ORAL 3 TIMES DAILY PRN
Qty: 20 TABLET | Refills: 0 | Status: SHIPPED | OUTPATIENT
Start: 2025-02-16 | End: 2025-02-16

## 2025-02-16 RX ORDER — MECLIZINE HYDROCHLORIDE 25 MG/1
25 TABLET ORAL 3 TIMES DAILY PRN
Qty: 20 TABLET | Refills: 0 | Status: SHIPPED | OUTPATIENT
Start: 2025-02-16

## 2025-02-16 RX ORDER — DEXMEDETOMIDINE HYDROCHLORIDE 4 UG/ML
0-1.4 INJECTION INTRAVENOUS CONTINUOUS
Status: DISCONTINUED | OUTPATIENT
Start: 2025-02-16 | End: 2025-02-16

## 2025-02-16 RX ADMIN — IOHEXOL 75 ML: 350 INJECTION, SOLUTION INTRAVENOUS at 12:02

## 2025-02-16 RX ADMIN — SODIUM CHLORIDE 1000 ML: 9 INJECTION, SOLUTION INTRAVENOUS at 04:02

## 2025-02-16 NOTE — PROGRESS NOTES
"Subjective:      Patient ID: Valdemar Allen is a 35 y.o. male.    Vitals:  height is 5' 7" (1.702 m) and weight is 83.9 kg (185 lb). His temperature is 98.3 °F (36.8 °C). His blood pressure is 131/87 and his pulse is 86. His respiration is 18 and oxygen saturation is 97%.     Chief Complaint: Ear Fullness    Patient clinic with a complaint of dizziness that has progressively worsening over the past 4-5 days.  Yesterday went to go tight his daughter shoes, whenever he came up he said he was so lightheaded he felt like he is going to pass out.  He had to grab the table to prevent falling.  He went outside in his symptoms persisted.  Denies any other exacerbating factors.  No previous history of vertigo.  He is complaining of right ear otalgia.  No recent URI symptoms.  No antecedent flights or diving.  Uses Q-tips every other day.  No muffled or doubled hearing.  No chest pain or shortness for breath.  No history of clots.    Ear Fullness   There is pain in the right ear. This is a new problem. The current episode started in the past 7 days (x 4 days). The problem has been gradually worsening. Associated symptoms include headaches. Pertinent negatives include no coughing. Associated symptoms comments: Dizziness, nausea, losing balance.       Constitution: Negative.   HENT:  Positive for ear pain. Negative for congestion.    Neck: neck negative.   Cardiovascular: Negative.    Eyes: Negative.    Respiratory:  Negative for cough.    Gastrointestinal: Negative.  Negative for abdominal trauma.   Endocrine: negative.   Genitourinary: Negative.    Musculoskeletal: Negative.    Skin: Negative.    Neurological:  Positive for dizziness, light-headedness, headaches and numbness.   Hematologic/Lymphatic: Negative.    Psychiatric/Behavioral: Negative.        Objective:     Physical Exam   Constitutional: He is oriented to person, place, and time. He is cooperative.   HENT:   Head: Normocephalic and atraumatic.   Ears:   Right Ear: " "Hearing, tympanic membrane, external ear and ear canal normal.   Left Ear: Hearing, tympanic membrane, external ear and ear canal normal.   Nose: Nose normal.   Mouth/Throat: Uvula is midline, oropharynx is clear and moist and mucous membranes are normal. Mucous membranes are moist. Oropharynx is clear.   Eyes: Conjunctivae and lids are normal. Pupils are equal, round, and reactive to light. No visual field deficit is present. Right eye exhibits no nystagmus. Left eye exhibits no nystagmus. Extraocular movement intact   Neck: Trachea normal and phonation normal. Neck supple.   Cardiovascular: Normal rate, regular rhythm, normal heart sounds and normal pulses.   Pulmonary/Chest: Effort normal and breath sounds normal.   Abdominal: Normal appearance.   Neurological: no focal deficit. He is alert, oriented to person, place, and time and at baseline. He has normal motor skills and intact cranial nerves (2-12). He displays no weakness, facial symmetry and no dysarthria. A sensory deficit is present. He has a normal Finger-Nose-Finger Test. Gait and coordination normal. GCS eye subscore is 4. GCS verbal subscore is 5. GCS motor subscore is 6.      Comments: Rapid alternating sequential motions intact.  Patient does have decreased sensations over the right cheek.      Quoting the phrase "you can not teach an old dog new tricks", Had to be repeated, but patient was able to repeat the phrase.  He is ambulatory with a normal steady gait.   Skin: Skin is warm and dry. Capillary refill takes 2 to 3 seconds.   Psychiatric: He experiences Normal attention and Normal perception. His speech is normal and behavior is normal. Mood, judgment and thought content normal.   Nursing note and vitals reviewed.      Assessment:     1. Dizziness    2. Facial numbness    3. Acute otalgia, right        Plan:       Dizziness    Facial numbness    Acute otalgia, right      Vital signs stable.  Otic exam normal.  NIH 1.  He is given NIH 1 for " "decreased sensations over the right cheek.  His symptoms started approximately 4 days ago.  He is outside of treatment window.  He is not on anticoagulants.  His symptoms are exacerbated with position changes.  There was no obvious nystagmus.  He is stating yesterday, when  looking to the left it also exacerbated his symptoms.  On NIH scale, I did have to repeat the phrase "you can not teach an old dog new tricks to patient"  to repeat.  He was able to repeat phrase on 2nd attempt.  I did not count his ability to not state phrase against him for aphasia.  Due to the physical exam findings I have recommended patient go the ER for further evaluation.  Patient did refuse orthostatics, EKG, and glucose testing.  A PP at [facility] notified via secure chat.  Release signed.  Differential considerations CVA, trigeminal neuralgia, vertigo, otitis media       Additional MDM:     NIH Stroke Scale:   Interval = baseline (upon arrival/admit)  Level of consciousness = 0 - alert  LOC questions = 0 - answers both correctly  LOC commands = 0 - performs both correctly  Best gaze = 0 - normal  Facial palsy = 0 - normal  Motor left arm =  0 - no drift  Motor right arm =  0 - no drift  Motor left leg = 0 - no drift  Motor right leg =  0 - no drift  Limb ataxia = 0 - absent  Sensory = 1 - mild to moderate loss  Best language = 0 - no aphasia  Dysarthria = 0 - normal articulation  Extinction and inattention = 0 - no neglect          "

## 2025-02-16 NOTE — ED PROVIDER NOTES
Encounter Date: 2/16/2025       History     Chief Complaint   Patient presents with    Dizziness     Dizziness and nausea x3 days. Worsening today. Sent from urgent care for further evaluation.     HPI 35-year-old man who presents emergency department complaining of lightheadedness for the past 3 or 4 days was associated tense numbness with some tingling over his right eyebrow extending to his right ear with increased pain/pressure in his right ear also for the past 3 days.  States symptoms have been gradually worsening.  He denies any cough or congestion.  No change in hearing.  Went to urgent care and they reported some decreased sensation over the right cheek with a otherwise unremarkable neuro exam and ear exam  Review of patient's allergies indicates:   Allergen Reactions    Augmentin [amoxicillin-pot clavulanate] Palpitations and Other (See Comments)     Patient states that he feels like is having a heart attack      Past Medical History:   Diagnosis Date    Chronic prostatitis 10/16/2012    Elevated transaminase measurement 9/14/2012    Herniated disc     Male pelvic pain 5/2/2013    Pelvic muscle wasting 5/9/2013    Prostatitis      Past Surgical History:   Procedure Laterality Date    ESOPHAGOGASTRODUODENOSCOPY N/A 5/21/2020    Procedure: EGD (ESOPHAGOGASTRODUODENOSCOPY);  Surgeon: Carmela Messina MD;  Location: [facility];  Service: Endoscopy;  Laterality: N/A;    TONSILLECTOMY       Family History   Problem Relation Name Age of Onset    Hypertension Father      Glaucoma Mother      Hypertension Maternal Aunt      Hypertension Maternal Uncle      Heart disease Maternal Uncle      Amblyopia Neg Hx      Blindness Neg Hx      Cataracts Neg Hx      Macular degeneration Neg Hx      Retinal detachment Neg Hx      Strabismus Neg Hx      Heart attack Neg Hx      Heart failure Neg Hx       Social History[1]  Review of Systems   Constitutional:  Negative for fever.   HENT:  Positive for ear pain. Negative for  sore throat.    Respiratory:  Negative for shortness of breath.    Cardiovascular:  Negative for chest pain.   Gastrointestinal:  Negative for nausea.   Genitourinary:  Negative for dysuria.   Musculoskeletal:  Negative for back pain.   Skin:  Negative for rash.   Neurological:  Positive for dizziness, light-headedness and numbness (? tenseness vs pain vs tingling ?). Negative for weakness.   Hematological:  Does not bruise/bleed easily.       Physical Exam     Initial Vitals [02/16/25 1103]   BP Pulse Resp Temp SpO2   139/73 93 18 98.4 °F (36.9 °C) 95 %      MAP       --         Physical Exam    Constitutional: Vital signs are normal. He appears well-developed and well-nourished.  Non-toxic appearance. No distress.   HENT:   Head: Normocephalic and atraumatic.   Eyes: EOM are normal. Pupils are equal, round, and reactive to light.   No nystagmus   Neck: Neck supple. No JVD present.   Normal range of motion.  Cardiovascular:  Normal rate, regular rhythm, normal heart sounds and intact distal pulses.     Exam reveals no gallop and no friction rub.       No murmur heard.  Pulmonary/Chest: Breath sounds normal. He has no wheezes. He has no rhonchi. He has no rales.   Abdominal: Abdomen is soft. Bowel sounds are normal. There is no abdominal tenderness. There is no rebound and no guarding.   Musculoskeletal:         General: Normal range of motion.      Cervical back: Normal range of motion and neck supple. No rigidity.     Neurological: He is alert and oriented to person, place, and time. He has normal strength and normal reflexes. A sensory deficit (describes tingling from his right eyebrow to ear) is present. No cranial nerve deficit. He exhibits normal muscle tone. Coordination normal. GCS eye subscore is 4. GCS verbal subscore is 5. GCS motor subscore is 6.   [place]manuel-Hallpike negative.  Tandem gait.  NIH 0   Skin: Skin is warm and dry.   Psychiatric: He has a normal mood and affect. His speech is normal and behavior is  normal. He is not actively hallucinating.         ED Course   Procedures  Labs Reviewed   CBC W/ AUTO DIFFERENTIAL - Abnormal       Result Value    WBC 4.93      RBC 5.30      Hemoglobin 14.3      Hematocrit 44.8      MCV 85      MCH 27.0      MCHC 31.9 (*)     RDW 12.7      Platelets 183      MPV 10.0      Immature Granulocytes 0.2      Gran # (ANC) 2.7      Immature Grans (Abs) 0.01      Lymph # 1.7      Mono # 0.4      Eos # 0.1      Baso # 0.04      nRBC 0      Gran % 53.8      Lymph % 33.9      Mono % 8.9      Eosinophil % 2.4      Basophil % 0.8      Differential Method Automated      Narrative:     Release to patient->Immediate   COMPREHENSIVE METABOLIC PANEL - Abnormal    Sodium 138      Potassium 3.7      Chloride 100      CO2 27      Glucose 116 (*)     BUN 13      Creatinine 1.1      Calcium 9.4      Total Protein 7.7      Albumin 4.1      Total Bilirubin 0.4      Alkaline Phosphatase 51      AST 17      ALT 33      eGFR >60      Anion Gap 11      Narrative:     Release to patient->Immediate   LIPID PANEL - Abnormal    Cholesterol 186      Triglycerides 143      HDL 38 (*)     LDL Cholesterol 119.4      HDL/Cholesterol Ratio 20.4      Total Cholesterol/HDL Ratio 4.9      Non-HDL Cholesterol 148      Narrative:     Release to patient->Immediate   PROTIME-INR    Prothrombin Time 12.2      INR 1.1      Narrative:     Release to patient->Immediate   TSH    TSH 0.534      Narrative:     Release to patient->Immediate   SARS-COV-2 RNA AMPLIFICATION, QUAL    SARS-CoV-2 RNA, Amplification, Qual Negative     HEPATITIS C ANTIBODY    Hepatitis C Ab Negative      Narrative:     Release to patient->Immediate     EKG Readings: (Independently Interpreted)   Rhythm: Normal Sinus Rhythm. Heart Rate: 66. Ectopy: No Ectopy. Conduction: Normal. ST Segments: Normal ST Segments. T Waves: Normal. Clinical Impression: Normal Sinus Rhythm     ECG Results              ECG 12 lead (Final result)        Collection Time Result Time  QRS Duration OHS QTC Calculation    02/16/25 11:57:11 02/16/25 18:50:49 88 396                     Final result by Interface, Lab In [facility] (02/16/25 18:50:58)                   Narrative:    Test Reason : R29.818,    Vent. Rate :  66 BPM     Atrial Rate :  66 BPM     P-R Int : 164 ms          QRS Dur :  88 ms      QT Int : 378 ms       P-R-T Axes :  37  46  44 degrees    QTcB Int : 396 ms    Normal sinus rhythm  Normal ECG  Confirmed by Kiana Simpson ([id number]) on 2/16/2025 6:50:45 PM    Referred By: AAAREFERRAL SELF           Confirmed By: Kiana Simpson                                  Imaging Results              MRI Brain Without Contrast (Final result)  Result time 02/16/25 17:03:24      Final result by Kalpesh Rosenberg MD (02/16/25 17:03:24)                   Impression:      1. There is no acute abnormality.  There is no intracranial hemorrhage, mass/mass effect, acute infarction.  2. Question tiny right frontal vertex arachnoid cyst versus focal prominent extra-axial space.  This is incidental.      Electronically signed by: Kalpesh Rosenberg MD  Date:    02/16/2025  Time:    17:03               Narrative:    EXAM:  MRI BRAIN WITHOUT CONTRAST    CLINICAL HISTORY:  Dizziness, persistent/recurrent, cardiac or vascular cause suspected; .    COMPARISON:  Head CT performed earlier today at 12:32 hours    FINDINGS:  Intracranial contents:There is no acute intracranial abnormality.  Brain volume, ventricular size and position are normal.  There is no intracranial hemorrhage or parenchymal mass/mass effect.  There are no regions of restricted diffusion to suggest acute infarction.  There are no definite regions of signal abnormality in the brain.  There is a small 1.4 mm slightly prominent focus of extra-axial fluid high in the right frontal vertex.  This could simply represent a prominent extra-axial space are tiny arachnoid cyst.  This is incidental.  The adjacent brain is normal in signal without  edema or gliosis.  There is no hydrocephalus or midline shift.  There is no abnormal extra-axial fluid collection.  The basilar cisterns are open.  Flow voids indicating patency are present in the major vessels at the base of the brain.  The cerebellar tonsils are in normal position.  The sellar structures are normal.  The orbits are grossly normal.    Extracranial contents, calvarium, soft tissues: There is normal marrow signal intensity in the clivus and calvarium. The nasal septum is deviated to the left.  The paranasal sinuses and mastoid air cells are clear.                                       CTA Head and Neck (xpd) (Final result)  Result time 02/16/25 13:31:26      Final result by Niranjan Carlson MD (02/16/25 13:31:26)                   Impression:      No acute abnormality. No high-grade stenosis or major vessel occlusion.      Electronically signed by: Niranjan Carlson  Date:    02/16/2025  Time:    13:31               Narrative:    EXAMINATION:  CTA HEAD AND NECK (XPD)    CLINICAL HISTORY:  Dizziness, persistent/recurrent, cardiac or vascular cause suspected;    TECHNIQUE:  CT angiogram was performed from the level of the douglas to the top of the head following the IV administration of 75mL of Omnipaque 350.   Sagittal and coronal reconstructions and maximum intensity projection reconstructions were performed. Arterial stenosis percentages are based on NASCET measurement criteria.    COMPARISON:  None    FINDINGS:  Intracranial Compartment:    Ventricles and sulci are normal in size for age without evidence of hydrocephalus. No extra-axial blood or fluid collections.    No evidence of parenchymal mass, hemorrhage, edema, or major vascular distribution infarct.    Skull/Extracranial Contents (limited evaluation): No fracture. Mastoid air cells and paranasal sinuses are essentially clear.    Non-Vascular Structures of the Neck/Thoracic Inlet (limited evaluation): Unremarkable.    Aorta:  Unremarkable.    Extracranial carotid circulation: Bilateral ICAs are tortuous.  No hemodynamically significant stenosis, aneurysmal dilatation, or dissection.    Extracranial vertebral circulation: No hemodynamically significant stenosis, aneurysmal dilatation, or dissection.    Intracranial Arteries: No focal high-grade stenosis, occlusion, or aneurysm.    Venous structures (limited evaluation): Unremarkable.                                       CT Head Without Contrast (Final result)  Result time 02/16/25 12:42:54      Final result by Niranjan Carlson MD (02/16/25 12:42:54)                   Impression:      No acute intracranial abnormality.      Electronically signed by: Niranjan Carlson  Date:    02/16/2025  Time:    12:42               Narrative:    EXAMINATION:  CT HEAD WITHOUT CONTRAST    CLINICAL HISTORY:  Neuro deficit, acute, stroke suspected;    TECHNIQUE:  Low dose axial CT images obtained throughout the head without intravenous contrast. Sagittal and coronal reconstructions were performed.    COMPARISON:  04/30/2023    FINDINGS:  Intracranial compartment:    Ventricles and sulci are normal in size for age without evidence of hydrocephalus. No extra-axial blood or fluid collections.    No parenchymal mass, hemorrhage, edema or major vascular distribution infarct.    Skull/extracranial contents (limited evaluation): No fracture. Mastoid air cells and paranasal sinuses are essentially clear.                                       Medications   iohexoL (OMNIPAQUE 350) 350 mg iodine/mL injection (75 mLs Intravenous Given 2/16/25 1233)   sodium chloride 0.9% bolus 1,000 mL 1,000 mL (0 mLs Intravenous Stopped 2/16/25 1956)     Medical Decision Making  35-year-old man who presents emergency department complaining of lightheadedness for the past 3 or 4 days was associated tense numbness with some tingling over his right eyebrow extending to his right ear with increased pain/pressure in his right ear also for  the past 3 days.  States symptoms have been gradually worsening.  He denies any cough or congestion.  No change in hearing.  Went to urgent care and they reported some decreased sensation over the right cheek with a otherwise unremarkable neuro exam and ear exam  Differential diagnosis includes CVA, Higginson Hunt syndrome, peripheral vertigo  Subacute Stroke workup initiated.  CT head, CTA showed no acute abnormalities.  VAn negative,  not a TNK candidate due to outside the window and possible stroke mimic.  MRI ordered for further evaluation and care turned over to oncoming physician at shift change pending results.      Thrombolysis Candidate? No, Out of window - Symptom onset > 4.5 hours, Strong suspicion for stroke mimic or alternative diagnosis     Delays to Thrombolysis?  Not Applicable      Amount and/or Complexity of Data Reviewed  Labs: ordered.  Radiology: ordered.    Risk  Prescription drug management.                                      Clinical Impression:  Final diagnoses:  [R29.818] Acute focal neurological deficit  [R42] Dizziness (Primary)          ED Disposition Condition    Discharge Stable          ED Prescriptions       Medication Sig Dispense Start Date End Date Auth. Provider    meclizine (ANTIVERT) 25 mg tablet  (Status: Discontinued) Take 1 tablet (25 mg total) by mouth 3 (three) times daily as needed. 20 tablet 2/16/2025 2/16/2025 Marquis Langley MD    meclizine (ANTIVERT) 25 mg tablet Take 1 tablet (25 mg total) by mouth 3 (three) times daily as needed. 20 tablet 2/16/2025 -- Nikita Bee MD          Follow-up Information       Follow up With Specialties Details Why Contact Info Additional Information    [facility] - ED Emergency Medicine  As needed, If symptoms worsen [address] [place] [address] [address] [place] [address] 1st floor                 [1]   Social History  Tobacco Use    Smoking status: Never    Smokeless tobacco: Never   Substance Use Topics    Alcohol use:  Not Currently    Drug use: No        Marquis Langley MD  02/17/25 2640

## 2025-02-17 ENCOUNTER — TELEPHONE (OUTPATIENT)
Dept: OTOLARYNGOLOGY | Facility: CLINIC | Age: 35
End: 2025-02-17
Payer: COMMERCIAL

## 2025-02-17 NOTE — TELEPHONE ENCOUNTER
Spoke w/pt per call back msg received.  W/pt, scheduled ENT appt for tomorrow afternoon, 02/18 @ 1430 w/MAGALYS Romeo.  Did advise pt that if cancellation occurs today, I will get in touch with him for same-day scheduling.  Pt has no further ENT questions/concerns at this time.

## 2025-02-17 NOTE — TELEPHONE ENCOUNTER
----- Message from Med Assistant Jeimy sent at 2/17/2025  8:35 AM CST -----  Pt went to ER over the weekend for Dizziness, Has seen Mr Romeo in the past Next available appt is not until 3/12/2025, would like to be seen sooner if possiblePlease call him @ [phone] !Please do NOT respond directly back to me, any questions, reply to staff box since this inbox is not routinely monitored

## 2025-02-18 ENCOUNTER — OFFICE VISIT (OUTPATIENT)
Dept: OTOLARYNGOLOGY | Facility: CLINIC | Age: 35
End: 2025-02-18
Payer: COMMERCIAL

## 2025-02-18 VITALS
SYSTOLIC BLOOD PRESSURE: 119 MMHG | RESPIRATION RATE: 16 BRPM | BODY MASS INDEX: 29.2 KG/M2 | DIASTOLIC BLOOD PRESSURE: 74 MMHG | HEIGHT: 67 IN | HEART RATE: 63 BPM | WEIGHT: 186.06 LBS

## 2025-02-18 DIAGNOSIS — M26.629 TMJ SYNDROME: Primary | ICD-10-CM

## 2025-02-18 DIAGNOSIS — R42 DIZZINESS: ICD-10-CM

## 2025-02-18 PROCEDURE — 3074F SYST BP LT 130 MM HG: CPT | Mod: CPTII,S$GLB,, | Performed by: PHYSICIAN ASSISTANT

## 2025-02-18 PROCEDURE — 3008F BODY MASS INDEX DOCD: CPT | Mod: CPTII,S$GLB,, | Performed by: PHYSICIAN ASSISTANT

## 2025-02-18 PROCEDURE — 1159F MED LIST DOCD IN RCRD: CPT | Mod: CPTII,S$GLB,, | Performed by: PHYSICIAN ASSISTANT

## 2025-02-18 PROCEDURE — 3078F DIAST BP <80 MM HG: CPT | Mod: CPTII,S$GLB,, | Performed by: PHYSICIAN ASSISTANT

## 2025-02-18 PROCEDURE — 1160F RVW MEDS BY RX/DR IN RCRD: CPT | Mod: CPTII,S$GLB,, | Performed by: PHYSICIAN ASSISTANT

## 2025-02-18 NOTE — PATIENT INSTRUCTIONS
TMJ Syndrome  This is a condition with chronic or recurrent pain in the joint of the jaw (in front of the ear). The pain may cause limited motion of the jaw, a locking or catching sensation, clicking, popping or grinding sounds from the joint with movement. It may also lead to headache, earache or neck pain. It is sometimes caused by inflammation in the joint, injury or wear-and-tear of the cartilage in the joint, involuntary grinding of the teeth or poorly fitting dentures. Emotional stress and tension are often a factor. Most cases resolve completely within a few months with proper treatment.    Home Care:  1) Rest the jaw by avoiding crunchy or hard foods to chew. Do not eat hard or sticky candies. Soft foods and liquids are easier on the jaw. Protect your jaw while yawning.    2) Hot packs (small towel soaked in hot water) applied to the jaw may give relief by reducing muscle spasm. You may use a heating pad or a towel soaked in hot water. Some people get relief with cold packs, so try both and see which one works best for you.    3) You may use ibuprofen (Motrin, Advil) to control pain, unless another medicine was prescribed. [ NOTE : If you have chronic liver or kidney disease or ever had a stomach ulcer or GI bleeding, talk with your doctor before using these medicines.]  - Typical dosing in an adult for anti-inflammatory     4) If you suspect emotional stress is related to your condition:    a) Try to identify the sources of stress in your life. It may not be obvious! These may include:  -- Daily hassles of life that pile up (traffic jams, missed appointments, car troubles)  -- Major life changes, both good (new baby, job promotion) and bad (loss of job, loss of loved one)  -- Overload: feeling that you have too many responsibilities and can't take care of everything at once  -- Helplessness: feeling like your problems are more than you can solve    b) When possible, do something about the source of your  stress: avoid hassles, limit the amount of change that is happening in your life at one time and take a break when you feel overloaded.    c) Unfortunately, many stressful situations cannot be avoided. Therefore, it is necessary to learn HOW TO MANAGE STRESS better. There are many proven methods that work and will reduce your anxiety. These include simple things like exercise, good nutrition and adequate rest. Also, there are certain techniques that are helpful: relaxation and breathing exercises, visualization, biofeedback, meditation or simply taking some time-out to clear your mind. For more information about this, consult your doctor or go to a local bookstore and review the many books and tapes available on this subject.    Follow up with dentist.

## 2025-02-18 NOTE — PROGRESS NOTES
[facility]    Subjective:      Patient: Valdemar Allen Patient PCP: David Lantigua FNP-C         :  1990     Sex:  male      MRN:  [id number]          Date of Visit: 2025      Chief Complaint: Vertigo/dizziness    Patient ID: Valdemar Allen is a 35 y.o. male who presents to office for evaluation of dizziness/vertigo. Pt went to  2025 for issues with dizziness and right facial numbness and right-sided otalgia. Pt states that three days prior to his  visit he started to have pressure in his right ear and right facial numbness and tension going from his right jaw to his temple. Pt was sent to ED. He has also had issues with headache. He is not having visual aura. He does not have personal h/o migraine. His mother gets migraines. Pt states he takes tylenol and ibuprofen as needed for headache which help. He had CTA head and neck, CT head WO, MRI Brain WO 2025 with only unusual finding being small right frontal vertex arachnoid cyst. Pt gets occasional right-sided high-pitched non-pulsatile tinnitus. He is not having issues with hearing loss. Pt has personalized nightguard for TMJ issues. Pt states that he has been having episodes where he will develop vertigo when laying down on his left or right side. He states that vertigo does not start suddenly. It only occurs a while after laying down and happens right when he is feeling peaceful and about to go to sleep. It is helped by getting up and and keeping his head still. The last episode occurred 2 days ago when he was laying on his right side. He is not having issues with dysequilibrium or falls. He has been drinking water with electrolytes and has not had issues in 2 days.     Past Medical History  He has a past medical history of Chronic prostatitis, Elevated transaminase measurement, Herniated disc, Male pelvic pain, Pelvic muscle wasting, and Prostatitis.    Family History  His family history includes Glaucoma in his mother; Heart  "disease in his maternal uncle; Hypertension in his father, maternal aunt, and maternal uncle.    Past Surgical History:   Procedure Laterality Date    ESOPHAGOGASTRODUODENOSCOPY N/A 5/21/2020    Procedure: EGD (ESOPHAGOGASTRODUODENOSCOPY);  Surgeon: Carmela Messina MD;  Location: [facility];  Service: Endoscopy;  Laterality: N/A;    TONSILLECTOMY       Social History     Tobacco Use    Smoking status: Never    Smokeless tobacco: Never   Substance and Sexual Activity    Alcohol use: Not Currently    Drug use: No    Sexual activity: Not Currently     Medications  He has a current medication list which includes the following prescription(s): cetirizine, meclizine, and mometasone.    Review of patient's allergies indicates:   Allergen Reactions    Augmentin [amoxicillin-pot clavulanate] Palpitations and Other (See Comments)     Patient states that he feels like is having a heart attack      All medications, allergies, and past history have been reviewed.    Objective:      Vitals:      2/16/2025    10:46 AM 2/16/2025    11:03 AM 2/18/2025     2:27 PM   Vitals - 1 value per visit   SYSTOLIC 131 139 119   DIASTOLIC 87 73 74   Pulse 86 93 63   Temp 98.3 °F (36.8 °C) 98.4 °F (36.9 °C)    Resp 18 18 16   SPO2 97 % 95 %    Weight (lb) 185 185 186.07   Weight (kg) 83.915 83.915 84.4   Height 5' 7" (1.702 m) 5' 7" (1.702 m) 5' 7" (1.702 m)   BMI (Calculated) 29 29 29.1   Pain Score   Zero       Body surface area is 2 meters squared.    Physical Exam  Constitutional:       General: He is not in acute distress.     Appearance: Normal appearance. He is not ill-appearing.   HENT:      Head: Normocephalic and atraumatic.      Right Ear: Tympanic membrane, ear canal and external ear normal.      Left Ear: Tympanic membrane, ear canal and external ear normal.      Nose: Septal deviation present.      Mouth/Throat:      Lips: Pink. No lesions.      Mouth: Mucous membranes are moist. No oral lesions.      Tongue: No lesions.      " Palate: No lesions.      Pharynx: Oropharynx is clear. Uvula midline. No pharyngeal swelling, oropharyngeal exudate, posterior oropharyngeal erythema or uvula swelling.      Tonsils: 0 on the right. 0 on the left.      Comments: S/p tonsillectomy.  Eyes:      General:         Right eye: No discharge.         Left eye: No discharge.      Extraocular Movements: Extraocular movements intact.      Conjunctiva/sclera: Conjunctivae normal.   Pulmonary:      Effort: Pulmonary effort is normal.   Neurological:      General: No focal deficit present.      Mental Status: He is alert and oriented to person, place, and time. Mental status is at baseline.   Psychiatric:         Mood and Affect: Mood normal.         Behavior: Behavior normal.         Thought Content: Thought content normal.         Judgment: Judgment normal.       Fukuda Step Test: Positive Left deviation  Romberg: Positive with a tendency to fall backward  [place]-Hallpike:   Negative Right  Negative Left.    Labs:  WBC   Date Value Ref Range Status   02/16/2025 4.93 3.90 - 12.70 K/uL Final     Platelets   Date Value Ref Range Status   02/16/2025 183 150 - 450 K/uL Final     Creatinine   Date Value Ref Range Status   02/16/2025 1.1 0.5 - 1.4 mg/dL Final     TSH   Date Value Ref Range Status   02/16/2025 0.534 0.400 - 4.000 uIU/mL Final     Glucose   Date Value Ref Range Status   02/16/2025 116 (H) 70 - 110 mg/dL Final     Hemoglobin A1C   Date Value Ref Range Status   05/04/2021 5.3 4.0 - 5.6 % Final     Comment:     ADA Screening Guidelines:  5.7-6.4%  Consistent with prediabetes  >or=6.5%  Consistent with diabetes    High levels of fetal hemoglobin interfere with the HbA1C  assay. Heterozygous hemoglobin variants (HbS, HgC, etc)do  not significantly interfere with this assay.   However, presence of multiple variants may affect accuracy.         Audiogram Summary:None at present.    All lab results and imaging results have been reviewed.    Assessment:         ICD-10-CM ICD-9-CM   1. TMJ syndrome  M26.629 524.69   2. Dizziness  R42 780.4            Plan:       Pt provided with TMJ syndrome at home care and exercises. If not helping with jaw issues, then he can reach out to office and I will place referral for physical therapy for TMJ syndrome. Pt's dizziness/vertigo not typical for BPPV although it is associated with prolonged laying down on his left or right side. It only occurs when he feels peaceful and is about to go to sleep. He has been drinking water with electrolytes and has not had issues in 2 days. Pt is to follow up in 2-3 weeks with comprehensive audiogram. If he gets recurrence of episodes while staying well-hydrated, then will further assess with [facility].

## 2025-02-25 ENCOUNTER — TELEPHONE (OUTPATIENT)
Dept: NEUROLOGY | Facility: CLINIC | Age: 35
End: 2025-02-25
Payer: COMMERCIAL

## 2025-02-25 NOTE — TELEPHONE ENCOUNTER
----- Message from Margot sent at 2/25/2025  2:18 PM CST -----  Regarding: Sooner Appointment Request  Contact: patient's wife at [phone]  Type:  Sooner Appointment RequestName of Caller:  patient's wife at [phone] or [phone] is the first available appointment?  noneSymptoms:  ER follow up, light headiness, possible brain cystAdditional Information:  Please call and advise. Thank you

## 2025-02-26 NOTE — TELEPHONE ENCOUNTER
Spoke with patient and scheduled appointment for ED follow up for 9:30 on 3/20/25 with Jacy Santos

## 2025-03-19 ENCOUNTER — CLINICAL SUPPORT (OUTPATIENT)
Dept: AUDIOLOGY | Facility: CLINIC | Age: 35
End: 2025-03-19
Payer: COMMERCIAL

## 2025-03-19 ENCOUNTER — OFFICE VISIT (OUTPATIENT)
Dept: OTOLARYNGOLOGY | Facility: CLINIC | Age: 35
End: 2025-03-19
Payer: COMMERCIAL

## 2025-03-19 VITALS
HEART RATE: 61 BPM | WEIGHT: 188.94 LBS | HEIGHT: 67 IN | SYSTOLIC BLOOD PRESSURE: 113 MMHG | BODY MASS INDEX: 29.65 KG/M2 | DIASTOLIC BLOOD PRESSURE: 78 MMHG

## 2025-03-19 DIAGNOSIS — Z01.10 NORMAL HEARING EXAM: Primary | ICD-10-CM

## 2025-03-19 DIAGNOSIS — Z01.10 NORMAL HEARING EXAM: ICD-10-CM

## 2025-03-19 DIAGNOSIS — M26.629 TMJ SYNDROME: ICD-10-CM

## 2025-03-19 DIAGNOSIS — R42 LIGHT HEADEDNESS: Primary | ICD-10-CM

## 2025-03-19 PROCEDURE — 99999 PR PBB SHADOW E&M-EST. PATIENT-LVL I: CPT | Mod: PBBFAC,,, | Performed by: AUDIOLOGIST

## 2025-03-19 PROCEDURE — 99999 PR PBB SHADOW E&M-EST. PATIENT-LVL III: CPT | Mod: PBBFAC,,, | Performed by: PHYSICIAN ASSISTANT

## 2025-03-19 NOTE — PROGRESS NOTES
[facility]    Subjective:      Patient: Valdemar Allen Patient PCP: David Lantigua FNP-C         :  1990     Sex:  male      MRN:  [id number]          Date of Visit: 2025      Chief Complaint: Dizziness f/u    Interval History 2025: Pt states that he was drinking 3-5 cups of coffee a day and is not drinking 1.5 cups a day. He is staying better hydrated. He has had improvement in his light-headedness. He was negative for BPPV at last OV. Pt continues to have right-sided aural fullness. He does have TMJ issues. Pt denies significant issue with headache. No chest pain/palpitations or numbness/tingling of the extremities.     Patient ID 2025: Valdemar Allen is a 35 y.o. male who presents to office for evaluation of dizziness/vertigo. Pt went to  2025 for issues with dizziness and right facial numbness and right-sided otalgia. Pt states that three days prior to his  visit he started to have pressure in his right ear and right facial numbness and tension going from his right jaw to his temple. Pt was sent to ED. He has also had issues with headache. He is not having visual aura. He does not have personal h/o migraine. His mother gets migraines. Pt states he takes tylenol and ibuprofen as needed for headache which help. He had CTA head and neck, CT head WO, MRI Brain WO 2025 with only unusual finding being small right frontal vertex arachnoid cyst. Pt gets occasional right-sided high-pitched non-pulsatile tinnitus. He is not having issues with hearing loss. Pt has personalized nightguard for TMJ issues. Pt states that he has been having episodes where he will develop vertigo when laying down on his left or right side. He states that vertigo does not start suddenly. It only occurs a while after laying down and happens right when he is feeling peaceful and about to go to sleep. It is helped by getting up and and keeping his head still. The last episode occurred 2 days ago  "when he was laying on his right side. He is not having issues with dysequilibrium or falls. He has been drinking water with electrolytes and has not had issues in 2 days.     Past Medical History  He has a past medical history of Chronic prostatitis, Elevated transaminase measurement, Herniated disc, Male pelvic pain, Pelvic muscle wasting, and Prostatitis.    Family History  His family history includes Glaucoma in his mother; Heart disease in his maternal uncle; Hypertension in his father, maternal aunt, and maternal uncle.    Past Surgical History:   Procedure Laterality Date    ESOPHAGOGASTRODUODENOSCOPY N/A 5/21/2020    Procedure: EGD (ESOPHAGOGASTRODUODENOSCOPY);  Surgeon: Carmela Messina MD;  Location: [facility];  Service: Endoscopy;  Laterality: N/A;    TONSILLECTOMY       Social History     Tobacco Use    Smoking status: Never    Smokeless tobacco: Never   Substance and Sexual Activity    Alcohol use: Not Currently    Drug use: No    Sexual activity: Not Currently     Medications  He has a current medication list which includes the following prescription(s): cetirizine, meclizine, and mometasone.    Review of patient's allergies indicates:   Allergen Reactions    Augmentin [amoxicillin-pot clavulanate] Palpitations and Other (See Comments)     Patient states that he feels like is having a heart attack      All medications, allergies, and past history have been reviewed.    Objective:      Vitals:      2/16/2025    11:03 AM 2/18/2025     2:27 PM 3/19/2025     1:13 PM   Vitals - 1 value per visit   SYSTOLIC 139 119 113   DIASTOLIC 73 74 78   Pulse 93 63 61   Temp 98.4 °F (36.9 °C)     Resp 18 16    SPO2 95 %     Weight (lb) 185 186.07 188.93   Weight (kg) 83.915 84.4 85.7   Height 5' 7" (1.702 m) 5' 7" (1.702 m) 5' 7" (1.702 m)   BMI (Calculated) 29 29.1 29.6   Pain Score  Zero Zero       Body surface area is 2.01 meters squared.    Physical Exam  Constitutional:       General: He is not in acute " distress.     Appearance: Normal appearance. He is not ill-appearing.   HENT:      Head: Normocephalic and atraumatic.      Right Ear: Tympanic membrane, ear canal and external ear normal.      Left Ear: Tympanic membrane, ear canal and external ear normal.      Nose: Septal deviation present.      Mouth/Throat:      Lips: Pink. No lesions.      Mouth: Mucous membranes are moist. No oral lesions.      Tongue: No lesions.      Palate: No lesions.      Pharynx: Oropharynx is clear. Uvula midline. No pharyngeal swelling, oropharyngeal exudate, posterior oropharyngeal erythema or uvula swelling.      Tonsils: 0 on the right. 0 on the left.      Comments: S/p tonsillectomy.  Eyes:      General:         Right eye: No discharge.         Left eye: No discharge.      Extraocular Movements: Extraocular movements intact.      Conjunctiva/sclera: Conjunctivae normal.   Pulmonary:      Effort: Pulmonary effort is normal.   Neurological:      General: No focal deficit present.      Mental Status: He is alert and oriented to person, place, and time. Mental status is at baseline.   Psychiatric:         Mood and Affect: Mood normal.         Behavior: Behavior normal.         Thought Content: Thought content normal.         Judgment: Judgment normal.         Labs:  WBC   Date Value Ref Range Status   02/16/2025 4.93 3.90 - 12.70 K/uL Final     Platelets   Date Value Ref Range Status   02/16/2025 183 150 - 450 K/uL Final     Creatinine   Date Value Ref Range Status   02/16/2025 1.1 0.5 - 1.4 mg/dL Final     TSH   Date Value Ref Range Status   02/16/2025 0.534 0.400 - 4.000 uIU/mL Final     Glucose   Date Value Ref Range Status   02/16/2025 116 (H) 70 - 110 mg/dL Final     Hemoglobin A1C   Date Value Ref Range Status   05/04/2021 5.3 4.0 - 5.6 % Final     Comment:     ADA Screening Guidelines:  5.7-6.4%  Consistent with prediabetes  >or=6.5%  Consistent with diabetes    High levels of fetal hemoglobin interfere with the HbA1C  assay.  Heterozygous hemoglobin variants (HbS, HgC, etc)do  not significantly interfere with this assay.   However, presence of multiple variants may affect accuracy.         Audiogram Summary:      All lab results and imaging results have been reviewed.    Assessment:        ICD-10-CM ICD-9-CM   1. Light headedness  R42 780.4   2. Normal hearing exam  Z01.10 V72.19   3. TMJ syndrome  M26.629 524.69           Plan:       Audiogram shows normal hearing AU and type A tymps AU-normal middle ear function. Pt is already aware of TMJ associated aural fullness and ear pain and TMJ care has already been provided to pt. Light-headedness improving with increased hydration. Pt advised to try not to exceed 1 cup of coffee a day and keep on staying well-hydrated as dehydration can cause light-headedness. Follow up if symptoms worsen or as needed for ENT issues/concerns.

## 2025-03-19 NOTE — PROGRESS NOTES
Valdemar Allen was seen on 03/19/2025 for an audiological evaluation. Pt was alone during today's visit. Pertinent complains today include dizziness. Pt denies history of loud noise exposure and denies early onset of genetic family history of hearing loss. Otoscopy revealed no cerumen in both ears. The tympanic membrane was visualized AU prior to proceeding with the hearing testing.      Results reveal normal hearing from 250-8000Hz bilaterally.    Speech Reception Thresholds were  15 dBHL for the right ear and 15 dBHL for the left ear.    Word recognition scores were excellent bilaterally.   Tympanograms were Type A for the right ear and Type A for the left ear.    Audiogram results were reviewed in detail with patient and all questions were answered. Results will be reviewed by the referring provider at the completion of this note. Recommend repeat hearing testing if concerns arise and bilateral hearing protection with either muffs or in-ear protection in loud noises. All complaints were addressed during this visit to the patient's satisfaction. Plan of care was discussed in detail with the patient, who agreed with the plan as above.

## 2025-03-20 ENCOUNTER — OFFICE VISIT (OUTPATIENT)
Dept: NEUROLOGY | Facility: CLINIC | Age: 35
End: 2025-03-20
Payer: COMMERCIAL

## 2025-03-20 VITALS
SYSTOLIC BLOOD PRESSURE: 106 MMHG | BODY MASS INDEX: 29.48 KG/M2 | WEIGHT: 187.81 LBS | HEIGHT: 67 IN | DIASTOLIC BLOOD PRESSURE: 71 MMHG | HEART RATE: 98 BPM | RESPIRATION RATE: 14 BRPM

## 2025-03-20 DIAGNOSIS — R42 DIZZINESS: Primary | ICD-10-CM

## 2025-03-20 PROCEDURE — 99204 OFFICE O/P NEW MOD 45 MIN: CPT | Mod: S$GLB,,,

## 2025-03-20 PROCEDURE — 1159F MED LIST DOCD IN RCRD: CPT | Mod: CPTII,S$GLB,,

## 2025-03-20 PROCEDURE — 3074F SYST BP LT 130 MM HG: CPT | Mod: CPTII,S$GLB,,

## 2025-03-20 PROCEDURE — 99999 PR PBB SHADOW E&M-EST. PATIENT-LVL IV: CPT | Mod: PBBFAC,,,

## 2025-03-20 PROCEDURE — 3078F DIAST BP <80 MM HG: CPT | Mod: CPTII,S$GLB,,

## 2025-03-20 PROCEDURE — 3008F BODY MASS INDEX DOCD: CPT | Mod: CPTII,S$GLB,,

## 2025-03-20 NOTE — PROGRESS NOTES
NEUROLOGY  Outpatient Consultation Visit   [facility]  [address] [facility] [address], [place] [address]  [phone] (office) / [phone] (fax)    Patient Name:  Valdemar Allen  :  1990  MR #:  [id number]  Acct #:  [id number]    Date of Neurology Consult: 2025  Name of Provider: DESHAWN Diez    Other Physicians:  David Lantigua FNP-C (Primary Care Physician); Ed[facility]* (Referring)      Chief Complaint: Hospital Follow Up (light headedness / new patient )      History of Present Illness (HPI):  Valdemar Allen is a right handed  35 y.o. male without signicant PMHX. Patient presents to clinic for evaluation of lightheadedness.     Patient presented to the ER  with dizziness and lightheaded sensation and right facial numbness. He describes the dizziness as the room spinning and feeling as if he is walking on a boat.     He does have a history of headaches and migraine they are worse with sleep deprvation. He is treating his headaches with tylenol and ibuprofen twice weekly.     In the ER he had a CTA head and neck and MRI Brain with incidentally noted right frontal vertex arachnoid cyst.     He was evaluated by ENT who did not suspect BPPV and was treated for TMJ syndrome.     He was previously seen by Cardiology for chest pain. Work up was unrevealing.     He has increased his water intake and has noticed improvement in symptoms and is sleeping propped up which he feels has helped as the dizziness is worse when trying to go to sleep.     He has been sleep deprived related to his children. He has a 4 year old and 18 month old.      He drinks approximately 100 ounces of water per day.       Past Medical, Surgical, Family & Social History:   Past Medical History:   Diagnosis Date    Chronic prostatitis 10/16/2012    Elevated transaminase measurement 2012    Herniated disc     Male pelvic pain 2013    Pelvic muscle wasting 2013     "Prostatitis      Past Surgical History:   Procedure Laterality Date    ESOPHAGOGASTRODUODENOSCOPY N/A 5/21/2020    Procedure: EGD (ESOPHAGOGASTRODUODENOSCOPY);  Surgeon: Carmela Messina MD;  Location: [facility];  Service: Endoscopy;  Laterality: N/A;    TONSILLECTOMY       Family History   Problem Relation Name Age of Onset    Migraines Mother      Glaucoma Mother      Hypertension Father      Hypertension Maternal Aunt      Hypertension Maternal Uncle      Heart disease Maternal Uncle      Amblyopia Neg Hx      Blindness Neg Hx      Cataracts Neg Hx      Macular degeneration Neg Hx      Retinal detachment Neg Hx      Strabismus Neg Hx      Heart attack Neg Hx      Heart failure Neg Hx       Alcohol use:  reports that he does not currently use alcohol.   (Of note, 0.6 oz = 1 beer or 6 oz = 10 beers).  Tobacco use:  reports that he has never smoked. He has never used smokeless tobacco.  Street drug use:  reports no history of drug use.  Allergies: Augmentin [amoxicillin-pot clavulanate].    Home Medications:   Current Medications[1]    Physical Examination:  /71 (BP Location: Left arm, Patient Position: Standing)   Pulse 98   Resp 14   Ht 5' 7" (1.702 m)   Wt 85.2 kg (187 lb 13.3 oz)   BMI 29.42 kg/m²     Neurological exam:  Mental status: Awake and alert.  Oriented to person, place, time and situation. Recent and remote memory appear to be intact.  Fund of knowledge normal. Normal attention and concentration.   Speech/Language: Fluent and appropriate. No dysarthria or aphasia on conversation. Able to follow complex commands.   Cranial nerves (II-XII): Visual fields full. Pupils equal round and reactive to light, extraocular movements intact, no ptosis, no nystagmus. Facial sensation and symmetry intact bilaterally. Hearing grossly intact. Palate mobile and midline. Shoulder shrug normal bilaterally. Normal tongue protrusion.   Motor: 5 out of 5 strength throughout the upper and lower extremities " bilaterally. Normal bulk and tone. No abnormal movements noted. No drift appreciated.   Sensation: Intact to light touch, pinprick, vibration and temperature bilaterally. Normal proprioception.   DTR: 2+ at the knees and biceps bilaterally.  Coordination: Finger-nose-finger testing intact bilaterally. SANTO normal bilaterally. No tremor. Romberg absent.   Gait: Normal gait, including heel, toe and tandem.      Diagnostic Data Reviewed:   I have personally reviewed provider notes, labs and imaging made available to me today.     Imaging:  MRI Brain 2/16/2025:   Personally reviewed. Normal MRI brain. Very small incidentally notes right frontal vertex arachnoid cyst.     CTA Head and Neck 2/16/2025:   FINDINGS:  Intracranial Compartment:     Ventricles and sulci are normal in size for age without evidence of hydrocephalus. No extra-axial blood or fluid collections.     No evidence of parenchymal mass, hemorrhage, edema, or major vascular distribution infarct.     Skull/Extracranial Contents (limited evaluation): No fracture. Mastoid air cells and paranasal sinuses are essentially clear.     Non-Vascular Structures of the Neck/Thoracic Inlet (limited evaluation): Unremarkable.     Aorta: Unremarkable.     Extracranial carotid circulation: Bilateral ICAs are tortuous.  No hemodynamically significant stenosis, aneurysmal dilatation, or dissection.     Extracranial vertebral circulation: No hemodynamically significant stenosis, aneurysmal dilatation, or dissection.     Intracranial Arteries: No focal high-grade stenosis, occlusion, or aneurysm.     Venous structures (limited evaluation): Unremarkable.     Impression:     No acute abnormality. No high-grade stenosis or major vessel occlusion.    Cardiac:  EKG 2/16/2025:  Vent. Rate :  66 BPM     Atrial Rate :  66 BPM     P-R Int : 164 ms          QRS Dur :  88 ms      QT Int : 378 ms       P-R-T Axes :  37  46  44 degrees    QTcB Int : 396 ms    Normal sinus rhythm  Normal  "ECG     Labs:  Lab Results   Component Value Date    WBC 4.93 02/16/2025    HGB 14.3 02/16/2025    HCT 44.8 02/16/2025     02/16/2025    MCV 85 02/16/2025    RDW 12.7 02/16/2025     Lab Results   Component Value Date     02/16/2025    K 3.7 02/16/2025     02/16/2025    CO2 27 02/16/2025    BUN 13 02/16/2025    CREATININE 1.1 02/16/2025     (H) 02/16/2025    CALCIUM 9.4 02/16/2025    MG 1.8 01/07/2022     Lab Results   Component Value Date    PROT 7.7 02/16/2025    ALBUMIN 4.1 02/16/2025    BILITOT 0.4 02/16/2025    AST 17 02/16/2025    ALKPHOS 51 02/16/2025    ALT 33 02/16/2025     Lab Results   Component Value Date    INR 1.1 02/16/2025     Lab Results   Component Value Date    CHOL 186 02/16/2025    HDL 38 (L) 02/16/2025    LDLCALC 119.4 02/16/2025    TRIG 143 02/16/2025    CHOLHDL 20.4 02/16/2025     Lab Results   Component Value Date    HGBA1C 5.3 05/04/2021      No results found for: "[id number]"  No results found for: "FOLATE"  Lab Results   Component Value Date    TSH 0.534 02/16/2025     No results found for: "VITAMINB1"  No results found for: "RPR"  No results found for: "INDY"  No components found for: "HEPATITISCANTIBODY"  No components found for: "HIV 1/2 AG/AB"  No results found for: "CRP"  No components found for: "SEDIMENTATIONRATE"      Assessment and Plan:  Valdemar Allen is a 35 y.o. male here for evaluation of dizziness.     Problem List Items Addressed This Visit       Dizziness - Primary    Current Assessment & Plan   34 y/o male with dizziness, lightheadedness, and right facial numbness  -MRI ad CTA Head and Neck reviewed and unrevealing and very reassuring from neurological standpoint   -Remote history of migraine. Recent sleep deprivation with small children. Discussed possibility of complex migraine. He is not interested in Headache clinic referral at this time. Recommended magnesium nightly for headache prevention.   -Continue to increase water intake and exercise " regularly.   -Discussed that the importance of adequate sleep when able.             Important to note, also  has a past medical history of Chronic prostatitis (10/16/2012), Elevated transaminase measurement (9/14/2012), Herniated disc, Male pelvic pain (5/2/2013), Pelvic muscle wasting (5/9/2013), and Prostatitis.      The patient will return to clinic PRN.     Thank you very much for the opportunity to assist in this patient's care.    If you have any questions or concerns, please do not hesitate to contact me at any time.    Sincerely,       DESHAWN Diez  [facility] [place]          [1]   Current Outpatient Medications:     cetirizine (ZYRTEC) 10 MG tablet, Take 1 tablet (10 mg total) by mouth once daily. (Patient taking differently: Take 10 mg by mouth daily as needed for Allergies.), Disp: 30 tablet, Rfl: 2    meclizine (ANTIVERT) 25 mg tablet, Take 1 tablet (25 mg total) by mouth 3 (three) times daily as needed. (Patient not taking: Reported on 3/19/2025), Disp: 20 tablet, Rfl: 0    mometasone (NASONEX) 50 mcg/actuation nasal spray, 2 sprays by Nasal route once daily. (Patient not taking: Reported on 2/16/2025), Disp: 17 g, Rfl: 0

## 2025-03-30 PROBLEM — R42 DIZZINESS: Status: ACTIVE | Noted: 2025-03-30

## 2025-03-30 NOTE — ASSESSMENT & PLAN NOTE
34 y/o male with dizziness, lightheadedness, and right facial numbness  -MRI ad CTA Head and Neck reviewed and unrevealing and very reassuring from neurological standpoint   -Remote history of migraine. Recent sleep deprivation with small children. Discussed possibility of complex migraine. He is not interested in Headache clinic referral at this time. Recommended magnesium nightly for headache prevention.   -Continue to increase water intake and exercise regularly.   -Discussed that the importance of adequate sleep when able.

## 2025-09-01 ENCOUNTER — OFFICE VISIT (OUTPATIENT)
Dept: URGENT CARE | Facility: CLINIC | Age: 35
End: 2025-09-01
Payer: COMMERCIAL

## 2025-09-01 VITALS
HEIGHT: 67 IN | OXYGEN SATURATION: 97 % | RESPIRATION RATE: 17 BRPM | SYSTOLIC BLOOD PRESSURE: 112 MMHG | DIASTOLIC BLOOD PRESSURE: 74 MMHG | WEIGHT: 187 LBS | HEART RATE: 77 BPM | BODY MASS INDEX: 29.35 KG/M2 | TEMPERATURE: 99 F

## 2025-09-01 DIAGNOSIS — S93.401A SPRAIN OF RIGHT ANKLE, UNSPECIFIED LIGAMENT, INITIAL ENCOUNTER: ICD-10-CM

## 2025-09-01 DIAGNOSIS — S99.911A INJURY OF RIGHT ANKLE, INITIAL ENCOUNTER: Primary | ICD-10-CM

## 2025-09-01 PROCEDURE — 99213 OFFICE O/P EST LOW 20 MIN: CPT | Mod: S$GLB,,, | Performed by: NURSE PRACTITIONER
